# Patient Record
Sex: FEMALE | Race: WHITE | Employment: FULL TIME | URBAN - NONMETROPOLITAN AREA
[De-identification: names, ages, dates, MRNs, and addresses within clinical notes are randomized per-mention and may not be internally consistent; named-entity substitution may affect disease eponyms.]

---

## 2017-02-02 ENCOUNTER — OFFICE VISIT (OUTPATIENT)
Dept: FAMILY MEDICINE CLINIC | Age: 57
End: 2017-02-02

## 2017-02-02 VITALS
SYSTOLIC BLOOD PRESSURE: 134 MMHG | HEART RATE: 66 BPM | WEIGHT: 150 LBS | HEIGHT: 64 IN | DIASTOLIC BLOOD PRESSURE: 72 MMHG | BODY MASS INDEX: 25.61 KG/M2

## 2017-02-02 DIAGNOSIS — E78.00 PURE HYPERCHOLESTEROLEMIA: Primary | ICD-10-CM

## 2017-02-02 DIAGNOSIS — J06.9 VIRAL UPPER RESPIRATORY TRACT INFECTION: ICD-10-CM

## 2017-02-02 DIAGNOSIS — E55.9 VITAMIN D DEFICIENCY: ICD-10-CM

## 2017-02-02 DIAGNOSIS — E78.2 HYPERLIPEMIA, MIXED: ICD-10-CM

## 2017-02-02 DIAGNOSIS — C91.12 CLL (CHRONIC LYMPHOID LEUKEMIA) IN RELAPSE (HCC): ICD-10-CM

## 2017-02-02 DIAGNOSIS — J32.4 CHRONIC PANSINUSITIS: ICD-10-CM

## 2017-02-02 DIAGNOSIS — J30.1 SEASONAL ALLERGIC RHINITIS DUE TO POLLEN: ICD-10-CM

## 2017-02-02 DIAGNOSIS — R53.83 FATIGUE, UNSPECIFIED TYPE: ICD-10-CM

## 2017-02-02 PROCEDURE — 99214 OFFICE O/P EST MOD 30 MIN: CPT | Performed by: PHYSICIAN ASSISTANT

## 2017-02-02 RX ORDER — EZETIMIBE 10 MG/1
10 TABLET ORAL DAILY
Qty: 90 TABLET | Refills: 1 | Status: SHIPPED | OUTPATIENT
Start: 2017-02-02 | End: 2017-08-04 | Stop reason: SDUPTHER

## 2017-02-02 RX ORDER — MOMETASONE FUROATE 50 UG/1
2 SPRAY, METERED NASAL DAILY
Qty: 3 INHALER | Refills: 3 | Status: SHIPPED | OUTPATIENT
Start: 2017-02-02 | End: 2018-10-05 | Stop reason: SDUPTHER

## 2017-02-02 RX ORDER — AZITHROMYCIN 250 MG/1
TABLET, FILM COATED ORAL
Qty: 1 PACKET | Refills: 1 | Status: SHIPPED | OUTPATIENT
Start: 2017-02-02 | End: 2017-02-12

## 2017-02-02 ASSESSMENT — ENCOUNTER SYMPTOMS
NAUSEA: 0
CONSTIPATION: 0
WHEEZING: 0
VOMITING: 0
COUGH: 1
RHINORRHEA: 1
CHEST TIGHTNESS: 0
DIARRHEA: 0
SORE THROAT: 1
SHORTNESS OF BREATH: 0

## 2017-03-06 LAB
ABSOLUTE EOS #: 0 K/UL (ref 0–0.4)
ABSOLUTE LYMPH #: 16.69 K/UL (ref 1–4.8)
ABSOLUTE MONO #: 0.62 K/UL (ref 0.1–1.2)
ATYPICAL LYMPHOCYTE ABSOLUTE COUNT: 1.85 K/UL
ATYPICAL LYMPHOCYTES: 9 % (ref 0–10)
BASOPHILS # BLD: 0 % (ref 0–2)
BASOPHILS ABSOLUTE: 0 K/UL (ref 0–0.2)
DIFFERENTIAL TYPE: ABNORMAL
EOSINOPHILS RELATIVE PERCENT: 0 % (ref 1–4)
HCT VFR BLD CALC: 38.9 % (ref 36–46)
HEMOGLOBIN: 12.4 G/DL (ref 12–16)
LYMPHOCYTES # BLD: 81 % (ref 24–44)
MCH RBC QN AUTO: 29.6 PG (ref 26–34)
MCHC RBC AUTO-ENTMCNC: 32 G/DL (ref 31–37)
MCV RBC AUTO: 92.5 FL (ref 80–100)
MONOCYTES # BLD: 3 % (ref 1–7)
MORPHOLOGY: ABNORMAL
PDW BLD-RTO: 13.2 % (ref 11–14.5)
PLATELET # BLD: 175 K/UL (ref 140–450)
PLATELET ESTIMATE: ABNORMAL
PMV BLD AUTO: 11 FL (ref 6–12)
RBC # BLD: 4.2 M/UL (ref 4–5.2)
RBC # BLD: ABNORMAL 10*6/UL
SEG NEUTROPHILS: 7 % (ref 36–66)
SEGMENTED NEUTROPHILS ABSOLUTE COUNT: 1.44 K/UL (ref 1.8–7.7)
WBC # BLD: 20.6 K/UL (ref 3.5–11)
WBC # BLD: ABNORMAL 10*3/UL

## 2017-04-06 LAB
ABSOLUTE EOS #: 0 K/UL (ref 0–0.4)
ABSOLUTE LYMPH #: 16.5 K/UL (ref 1–4.8)
ABSOLUTE MONO #: 1.1 K/UL (ref 0.1–1.2)
BASOPHILS # BLD: 1 % (ref 0–2)
BASOPHILS ABSOLUTE: 0.22 K/UL (ref 0–0.2)
DIFFERENTIAL TYPE: ABNORMAL
EOSINOPHILS RELATIVE PERCENT: 0 % (ref 1–4)
HCT VFR BLD CALC: 37.5 % (ref 36–46)
HEMOGLOBIN: 12.7 G/DL (ref 12–16)
LYMPHOCYTES # BLD: 75 % (ref 24–44)
MCH RBC QN AUTO: 30.5 PG (ref 26–34)
MCHC RBC AUTO-ENTMCNC: 33.9 G/DL (ref 31–37)
MCV RBC AUTO: 89.7 FL (ref 80–100)
MONOCYTES # BLD: 5 % (ref 1–7)
MORPHOLOGY: ABNORMAL
PDW BLD-RTO: 12.5 % (ref 11–14.5)
PLATELET # BLD: 220 K/UL (ref 140–450)
PLATELET ESTIMATE: ABNORMAL
PMV BLD AUTO: 10.2 FL (ref 6–12)
RBC # BLD: 4.18 M/UL (ref 4–5.2)
RBC # BLD: ABNORMAL 10*6/UL
SEG NEUTROPHILS: 19 % (ref 36–66)
SEGMENTED NEUTROPHILS ABSOLUTE COUNT: 4.18 K/UL (ref 1.8–7.7)
WBC # BLD: 22 K/UL (ref 3.5–11)
WBC # BLD: ABNORMAL 10*3/UL

## 2017-04-10 ENCOUNTER — TELEPHONE (OUTPATIENT)
Dept: FAMILY MEDICINE CLINIC | Age: 57
End: 2017-04-10

## 2017-05-06 DIAGNOSIS — F43.9 STRESS: ICD-10-CM

## 2017-05-06 DIAGNOSIS — F41.9 ANXIETY: ICD-10-CM

## 2017-05-09 RX ORDER — ALPRAZOLAM 0.5 MG/1
TABLET ORAL
Qty: 45 TABLET | Refills: 5 | Status: SHIPPED | OUTPATIENT
Start: 2017-05-09 | End: 2017-12-05 | Stop reason: SDUPTHER

## 2017-05-15 ENCOUNTER — OFFICE VISIT (OUTPATIENT)
Dept: CARDIOLOGY | Age: 57
End: 2017-05-15
Payer: COMMERCIAL

## 2017-05-15 VITALS
HEART RATE: 75 BPM | RESPIRATION RATE: 16 BRPM | SYSTOLIC BLOOD PRESSURE: 122 MMHG | HEIGHT: 64 IN | BODY MASS INDEX: 23.83 KG/M2 | WEIGHT: 139.6 LBS | DIASTOLIC BLOOD PRESSURE: 74 MMHG

## 2017-05-15 DIAGNOSIS — E78.5 HYPERLIPIDEMIA, UNSPECIFIED HYPERLIPIDEMIA TYPE: ICD-10-CM

## 2017-05-15 DIAGNOSIS — I51.89 DIASTOLIC DYSFUNCTION: Primary | ICD-10-CM

## 2017-05-15 PROCEDURE — 99213 OFFICE O/P EST LOW 20 MIN: CPT | Performed by: INTERNAL MEDICINE

## 2017-05-15 PROCEDURE — 93000 ELECTROCARDIOGRAM COMPLETE: CPT | Performed by: INTERNAL MEDICINE

## 2017-05-15 RX ORDER — IBRUTINIB 140 MG/1
1 TABLET, FILM COATED ORAL DAILY
COMMUNITY
Start: 2017-05-08

## 2017-08-04 ENCOUNTER — OFFICE VISIT (OUTPATIENT)
Dept: FAMILY MEDICINE CLINIC | Age: 57
End: 2017-08-04
Payer: COMMERCIAL

## 2017-08-04 VITALS
WEIGHT: 135 LBS | SYSTOLIC BLOOD PRESSURE: 120 MMHG | OXYGEN SATURATION: 98 % | DIASTOLIC BLOOD PRESSURE: 68 MMHG | HEIGHT: 64 IN | BODY MASS INDEX: 23.05 KG/M2 | HEART RATE: 62 BPM

## 2017-08-04 DIAGNOSIS — L98.9 SKIN LESION: Primary | ICD-10-CM

## 2017-08-04 DIAGNOSIS — E78.00 PURE HYPERCHOLESTEROLEMIA: ICD-10-CM

## 2017-08-04 DIAGNOSIS — C91.12 CLL (CHRONIC LYMPHOID LEUKEMIA) IN RELAPSE (HCC): ICD-10-CM

## 2017-08-04 DIAGNOSIS — J30.1 SEASONAL ALLERGIC RHINITIS DUE TO POLLEN: ICD-10-CM

## 2017-08-04 DIAGNOSIS — E78.2 HYPERLIPEMIA, MIXED: ICD-10-CM

## 2017-08-04 DIAGNOSIS — F41.8 DEPRESSION WITH ANXIETY: ICD-10-CM

## 2017-08-04 PROCEDURE — 99214 OFFICE O/P EST MOD 30 MIN: CPT | Performed by: PHYSICIAN ASSISTANT

## 2017-08-04 RX ORDER — EZETIMIBE 10 MG/1
10 TABLET ORAL DAILY
Qty: 90 TABLET | Refills: 1 | Status: SHIPPED | OUTPATIENT
Start: 2017-08-04 | End: 2018-05-21 | Stop reason: ALTCHOICE

## 2017-08-04 ASSESSMENT — ENCOUNTER SYMPTOMS
NAUSEA: 1
DIARRHEA: 0
CHEST TIGHTNESS: 0
COUGH: 0
SHORTNESS OF BREATH: 0

## 2017-08-06 ASSESSMENT — ENCOUNTER SYMPTOMS
VOMITING: 0
RHINORRHEA: 0
SORE THROAT: 0
TROUBLE SWALLOWING: 0
ABDOMINAL PAIN: 0
CONSTIPATION: 0
BLOOD IN STOOL: 0
WHEEZING: 0
SINUS PRESSURE: 0

## 2017-09-11 ENCOUNTER — HOSPITAL ENCOUNTER (OUTPATIENT)
Dept: LAB | Age: 57
Discharge: HOME | End: 2017-09-11
Payer: COMMERCIAL

## 2017-09-11 DIAGNOSIS — E78.00 PURE HYPERCHOLESTEROLEMIA: ICD-10-CM

## 2017-09-11 DIAGNOSIS — E78.2 HYPERLIPEMIA, MIXED: ICD-10-CM

## 2017-09-11 LAB
CHOLESTEROL/HDL RATIO: 2.9
CHOLESTEROL: 197 MG/DL
HDLC SERPL-MCNC: 68 MG/DL
LDL CHOLESTEROL: 114 MG/DL (ref 0–130)
TRIGL SERPL-MCNC: 74 MG/DL
VLDLC SERPL CALC-MCNC: NORMAL MG/DL (ref 1–30)

## 2017-09-11 PROCEDURE — 80061 LIPID PANEL: CPT

## 2017-09-11 PROCEDURE — 36415 COLL VENOUS BLD VENIPUNCTURE: CPT

## 2017-12-05 DIAGNOSIS — F43.9 STRESS: ICD-10-CM

## 2017-12-05 DIAGNOSIS — F41.9 ANXIETY: ICD-10-CM

## 2017-12-05 RX ORDER — ALPRAZOLAM 0.5 MG/1
TABLET ORAL
Qty: 45 TABLET | Refills: 5 | Status: SHIPPED | OUTPATIENT
Start: 2017-12-05 | End: 2018-07-01 | Stop reason: SDUPTHER

## 2018-02-15 ENCOUNTER — OFFICE VISIT (OUTPATIENT)
Dept: FAMILY MEDICINE CLINIC | Age: 58
End: 2018-02-15
Payer: COMMERCIAL

## 2018-02-15 ENCOUNTER — NURSE ONLY (OUTPATIENT)
Dept: LAB | Age: 58
End: 2018-02-15
Payer: COMMERCIAL

## 2018-02-15 VITALS
DIASTOLIC BLOOD PRESSURE: 70 MMHG | HEART RATE: 70 BPM | OXYGEN SATURATION: 97 % | WEIGHT: 135 LBS | HEIGHT: 64 IN | SYSTOLIC BLOOD PRESSURE: 132 MMHG | BODY MASS INDEX: 23.05 KG/M2

## 2018-02-15 DIAGNOSIS — F41.9 ANXIETY: ICD-10-CM

## 2018-02-15 DIAGNOSIS — E78.00 PURE HYPERCHOLESTEROLEMIA: ICD-10-CM

## 2018-02-15 DIAGNOSIS — R53.82 CHRONIC FATIGUE: ICD-10-CM

## 2018-02-15 DIAGNOSIS — Z23 NEED FOR TETANUS BOOSTER: ICD-10-CM

## 2018-02-15 DIAGNOSIS — M85.88 OSTEOPENIA OF SPINE: Primary | ICD-10-CM

## 2018-02-15 DIAGNOSIS — C91.10 CLL (CHRONIC LYMPHOCYTIC LEUKEMIA) (HCC): ICD-10-CM

## 2018-02-15 DIAGNOSIS — J32.4 CHRONIC PANSINUSITIS: ICD-10-CM

## 2018-02-15 DIAGNOSIS — Z85.72 HISTORY OF NON-HODGKIN'S LYMPHOMA: ICD-10-CM

## 2018-02-15 DIAGNOSIS — Z23 NEED FOR VACCINATION: Primary | ICD-10-CM

## 2018-02-15 PROCEDURE — 90471 IMMUNIZATION ADMIN: CPT | Performed by: PHYSICIAN ASSISTANT

## 2018-02-15 PROCEDURE — 90715 TDAP VACCINE 7 YRS/> IM: CPT | Performed by: PHYSICIAN ASSISTANT

## 2018-02-15 PROCEDURE — 99214 OFFICE O/P EST MOD 30 MIN: CPT | Performed by: PHYSICIAN ASSISTANT

## 2018-02-15 ASSESSMENT — ENCOUNTER SYMPTOMS
RESPIRATORY NEGATIVE: 1
NAUSEA: 1

## 2018-02-15 NOTE — PROGRESS NOTES
present. Thyroid nontender no palpable. No carotid bruits. Lymphadenopathy has improved dramatically in her neck. Markedly reduced and almost nonpalpable. Cardiovascular: Normal rate, regular rhythm and normal heart sounds. Exam reveals no gallop and no friction rub. No murmur heard. Pulmonary/Chest: Effort normal and breath sounds normal. She has no wheezes. She has no rales. Abdominal: Soft. Bowel sounds are normal. She exhibits no distension and no mass. There is no tenderness. There is no rebound and no guarding. Musculoskeletal: She exhibits no edema. Lymphadenopathy:     She has no cervical adenopathy. Neurological: She is alert and oriented to person, place, and time. Skin: Skin is warm and dry. No rash noted. Psychiatric: She has a normal mood and affect. Her behavior is normal. Judgment and thought content normal.   Nursing note and vitals reviewed. Assessment:      1. Osteopenia of spine  DEXA BONE DENSITY 2 SITES   2. Need for tetanus booster  Tdap (age 6y and older) IM ([address])   3. Pure hypercholesterolemia  Lipid Panel    Lipid, Fasting   4. Chronic fatigue  Vitamin D 25 Hydroxy    Vitamin B12 & Folate    TSH without Reflex   5. CLL (chronic lymphocytic leukemia) ([facility] [address]     6. Anxiety     7. History of non-Hodgkin's lymphoma     8. Chronic pansinusitis             Plan:       Will stop zetia for now stay on lovaza  Will repeat lipids in 3 months  She will be notified of all results  Further treatment based on results  Diet as tolerated  Follow-up with [address] [place] [address],[address]  Follow-up with me 6 months sooner if problems  I will contact her with lab results and we'll discuss treatment options at that time

## 2018-02-17 ASSESSMENT — ENCOUNTER SYMPTOMS
COUGH: 0
CHEST TIGHTNESS: 0
SHORTNESS OF BREATH: 0
VOMITING: 0
DIARRHEA: 0
CONSTIPATION: 0
WHEEZING: 0

## 2018-02-20 ENCOUNTER — HOSPITAL ENCOUNTER (OUTPATIENT)
Dept: LAB | Age: 58
Setting detail: SPECIMEN
Discharge: HOME | End: 2018-02-20
Payer: COMMERCIAL

## 2018-02-20 ENCOUNTER — HOSPITAL ENCOUNTER (OUTPATIENT)
Dept: BONE DENSITY | Age: 58
Discharge: HOME | End: 2018-02-22
Payer: COMMERCIAL

## 2018-02-20 DIAGNOSIS — R53.82 CHRONIC FATIGUE: ICD-10-CM

## 2018-02-20 DIAGNOSIS — M85.88 OSTEOPENIA OF SPINE: ICD-10-CM

## 2018-02-20 LAB — TSH SERPL DL<=0.05 MIU/L-ACNC: 2.56 MIU/L (ref 0.3–5)

## 2018-02-20 PROCEDURE — 84443 ASSAY THYROID STIM HORMONE: CPT

## 2018-02-20 PROCEDURE — 77080 DXA BONE DENSITY AXIAL: CPT

## 2018-02-20 PROCEDURE — 36415 COLL VENOUS BLD VENIPUNCTURE: CPT

## 2018-05-21 ENCOUNTER — OFFICE VISIT (OUTPATIENT)
Dept: CARDIOLOGY | Age: 58
End: 2018-05-21
Payer: COMMERCIAL

## 2018-05-21 ENCOUNTER — HOSPITAL ENCOUNTER (OUTPATIENT)
Dept: LAB | Age: 58
Setting detail: SPECIMEN
Discharge: HOME | End: 2018-05-21
Payer: COMMERCIAL

## 2018-05-21 VITALS
BODY MASS INDEX: 22.33 KG/M2 | WEIGHT: 130.8 LBS | HEART RATE: 60 BPM | HEIGHT: 64 IN | SYSTOLIC BLOOD PRESSURE: 122 MMHG | DIASTOLIC BLOOD PRESSURE: 72 MMHG

## 2018-05-21 DIAGNOSIS — E78.00 PURE HYPERCHOLESTEROLEMIA: ICD-10-CM

## 2018-05-21 DIAGNOSIS — I51.89 DIASTOLIC DYSFUNCTION: ICD-10-CM

## 2018-05-21 DIAGNOSIS — E78.5 HYPERLIPIDEMIA, UNSPECIFIED HYPERLIPIDEMIA TYPE: Primary | ICD-10-CM

## 2018-05-21 PROBLEM — E78.49 OTHER HYPERLIPIDEMIA: Status: ACTIVE | Noted: 2018-05-21

## 2018-05-21 LAB
CHOLESTEROL, FASTING: 206 MG/DL
CHOLESTEROL/HDL RATIO: 3.2
HDLC SERPL-MCNC: 64 MG/DL
LDL CHOLESTEROL: 130 MG/DL (ref 0–130)
TRIGLYCERIDE, FASTING: 60 MG/DL
VLDLC SERPL CALC-MCNC: ABNORMAL MG/DL (ref 1–30)

## 2018-05-21 PROCEDURE — 36415 COLL VENOUS BLD VENIPUNCTURE: CPT

## 2018-05-21 PROCEDURE — 99213 OFFICE O/P EST LOW 20 MIN: CPT | Performed by: INTERNAL MEDICINE

## 2018-05-21 PROCEDURE — 93000 ELECTROCARDIOGRAM COMPLETE: CPT | Performed by: INTERNAL MEDICINE

## 2018-05-21 PROCEDURE — 80061 LIPID PANEL: CPT

## 2018-05-21 RX ORDER — EZETIMIBE 10 MG/1
10 TABLET ORAL DAILY
Qty: 90 TABLET | Refills: 3 | Status: SHIPPED | OUTPATIENT
Start: 2018-05-21 | End: 2018-06-11 | Stop reason: SDUPTHER

## 2018-05-24 ENCOUNTER — TELEPHONE (OUTPATIENT)
Dept: CARDIOLOGY | Age: 58
End: 2018-05-24

## 2018-05-31 ENCOUNTER — HOSPITAL ENCOUNTER (OUTPATIENT)
Dept: INTERVENTIONAL RADIOLOGY/VASCULAR | Age: 58
Discharge: HOME | End: 2018-06-02
Payer: COMMERCIAL

## 2018-05-31 ENCOUNTER — OFFICE VISIT (OUTPATIENT)
Dept: PRIMARY CARE CLINIC | Age: 58
End: 2018-05-31
Payer: COMMERCIAL

## 2018-05-31 VITALS
SYSTOLIC BLOOD PRESSURE: 124 MMHG | WEIGHT: 130 LBS | BODY MASS INDEX: 22.3 KG/M2 | HEART RATE: 88 BPM | TEMPERATURE: 98.6 F | OXYGEN SATURATION: 97 % | DIASTOLIC BLOOD PRESSURE: 80 MMHG

## 2018-05-31 DIAGNOSIS — M79.604 PAIN IN RIGHT LEG: ICD-10-CM

## 2018-05-31 DIAGNOSIS — M71.21 SYNOVIAL CYST OF RIGHT POPLITEAL SPACE: Primary | ICD-10-CM

## 2018-05-31 DIAGNOSIS — M79.89 RIGHT LEG SWELLING: ICD-10-CM

## 2018-05-31 PROCEDURE — 93971 EXTREMITY STUDY: CPT

## 2018-05-31 PROCEDURE — 99214 OFFICE O/P EST MOD 30 MIN: CPT | Performed by: FAMILY MEDICINE

## 2018-05-31 ASSESSMENT — PATIENT HEALTH QUESTIONNAIRE - PHQ9
2. FEELING DOWN, DEPRESSED OR HOPELESS: 0
SUM OF ALL RESPONSES TO PHQ QUESTIONS 1-9: 0
1. LITTLE INTEREST OR PLEASURE IN DOING THINGS: 0
SUM OF ALL RESPONSES TO PHQ9 QUESTIONS 1 & 2: 0

## 2018-05-31 ASSESSMENT — ENCOUNTER SYMPTOMS: COLOR CHANGE: 0

## 2018-06-11 DIAGNOSIS — E78.5 HYPERLIPIDEMIA, UNSPECIFIED HYPERLIPIDEMIA TYPE: ICD-10-CM

## 2018-06-11 RX ORDER — EZETIMIBE 10 MG/1
10 TABLET ORAL DAILY
Qty: 90 TABLET | Refills: 3 | Status: SHIPPED | OUTPATIENT
Start: 2018-06-11 | End: 2019-12-20 | Stop reason: SDUPTHER

## 2018-07-01 DIAGNOSIS — F43.9 STRESS: ICD-10-CM

## 2018-07-01 DIAGNOSIS — F41.9 ANXIETY: ICD-10-CM

## 2018-07-02 RX ORDER — ALPRAZOLAM 0.5 MG/1
TABLET ORAL
Qty: 45 TABLET | Refills: 5 | Status: SHIPPED | OUTPATIENT
Start: 2018-07-02 | End: 2019-01-03 | Stop reason: SDUPTHER

## 2018-07-09 ENCOUNTER — TELEPHONE (OUTPATIENT)
Dept: FAMILY MEDICINE CLINIC | Age: 58
End: 2018-07-09

## 2018-07-10 ENCOUNTER — HOSPITAL ENCOUNTER (OUTPATIENT)
Dept: GENERAL RADIOLOGY | Age: 58
Discharge: HOME | End: 2018-07-12
Payer: COMMERCIAL

## 2018-07-10 ENCOUNTER — OFFICE VISIT (OUTPATIENT)
Dept: FAMILY MEDICINE CLINIC | Age: 58
End: 2018-07-10
Payer: COMMERCIAL

## 2018-07-10 ENCOUNTER — OFFICE VISIT (OUTPATIENT)
Dept: ORTHOPEDIC SURGERY | Age: 58
End: 2018-07-10
Payer: COMMERCIAL

## 2018-07-10 VITALS
HEART RATE: 75 BPM | WEIGHT: 132 LBS | DIASTOLIC BLOOD PRESSURE: 62 MMHG | SYSTOLIC BLOOD PRESSURE: 110 MMHG | BODY MASS INDEX: 22.53 KG/M2 | OXYGEN SATURATION: 98 % | HEIGHT: 64 IN

## 2018-07-10 VITALS
SYSTOLIC BLOOD PRESSURE: 110 MMHG | WEIGHT: 132 LBS | HEART RATE: 75 BPM | BODY MASS INDEX: 22.53 KG/M2 | HEIGHT: 64 IN | DIASTOLIC BLOOD PRESSURE: 62 MMHG

## 2018-07-10 DIAGNOSIS — M25.561 ACUTE PAIN OF RIGHT KNEE: ICD-10-CM

## 2018-07-10 DIAGNOSIS — R22.41 MASS OF RIGHT KNEE: ICD-10-CM

## 2018-07-10 DIAGNOSIS — M25.861 CYST OF RIGHT KNEE JOINT: ICD-10-CM

## 2018-07-10 DIAGNOSIS — C91.10 CHRONIC LYMPHOCYTIC LEUKEMIA (HCC): Primary | ICD-10-CM

## 2018-07-10 DIAGNOSIS — M25.861 CYST OF RIGHT KNEE JOINT: Primary | ICD-10-CM

## 2018-07-10 PROCEDURE — 99202 OFFICE O/P NEW SF 15 MIN: CPT | Performed by: PHYSICIAN ASSISTANT

## 2018-07-10 PROCEDURE — 99213 OFFICE O/P EST LOW 20 MIN: CPT | Performed by: PHYSICIAN ASSISTANT

## 2018-07-10 PROCEDURE — 73562 X-RAY EXAM OF KNEE 3: CPT

## 2018-07-10 ASSESSMENT — ENCOUNTER SYMPTOMS
COUGH: 0
SHORTNESS OF BREATH: 0
NAUSEA: 0
COLOR CHANGE: 0

## 2018-07-10 NOTE — PROGRESS NOTES
lesion noted in the medial right popliteal area it is mildly tender to palpation. Cyst is palpated with leg extended. Neurological: She is oriented to person, place, and time. Nl strength and sensation lower extremities bilaterally. Has nl gait. Skin: Skin is dry. No rash noted. No erythema. Psychiatric: She has a normal mood and affect. Nursing note and vitals reviewed. Xr Knee Right (3 Views)    Result Date: 7/10/2018  EXAMINATION: 3 XRAY VIEWS OF THE RIGHT KNEE 7/10/2018 12:03 pm COMPARISON: None. HISTORY: ORDERING SYSTEM PROVIDED HISTORY: Cyst of right knee joint TECHNOLOGIST PROVIDED HISTORY: Reason for exam:->right knee pain ?bakers cyst Ordering Physician Provided Reason for Exam: No injury; pt states posterior rt knee pain; ?bakers cyst Acuity: Chronic Type of Exam: Unknown FINDINGS: No acute fracture, dislocation or suspicious osseous lesions are identified. No joint effusion identified. Joint spaces appear maintained. Visualized osseous structures appear mildly demineralized. Soft tissues have a normal radiographic appearance. Suspected osteoporotic changes, but no radiographic evidence for acute osseous abnormality. Assessment:       Diagnosis Orders   1. Cyst of right knee joint  Ambulatory referral to Orthopedic Surgery    XR KNEE RIGHT (3 VIEWS)   2. Acute pain of right knee  Ambulatory referral to Orthopedic Surgery    XR KNEE RIGHT (3 VIEWS)           Plan:       Will have orthopedics see patient asap  Will let ortho decide what testing is best for patient  Will get xray before the see patient  Reviewed recent ultrasound  nsaids heat   Recommended a neoprene brace if it helps  Answered all patients questions

## 2018-07-16 ENCOUNTER — HOSPITAL ENCOUNTER (OUTPATIENT)
Dept: MRI IMAGING | Age: 58
Discharge: HOME | End: 2018-07-18
Payer: COMMERCIAL

## 2018-07-16 DIAGNOSIS — C91.10 CHRONIC LYMPHOCYTIC LEUKEMIA (HCC): ICD-10-CM

## 2018-07-16 DIAGNOSIS — M25.561 ACUTE PAIN OF RIGHT KNEE: ICD-10-CM

## 2018-07-16 DIAGNOSIS — R22.41 MASS OF RIGHT KNEE: ICD-10-CM

## 2018-07-16 PROCEDURE — 73721 MRI JNT OF LWR EXTRE W/O DYE: CPT

## 2018-07-24 ENCOUNTER — TELEPHONE (OUTPATIENT)
Dept: FAMILY MEDICINE CLINIC | Age: 58
End: 2018-07-24

## 2018-07-25 ENCOUNTER — TELEPHONE (OUTPATIENT)
Dept: ORTHOPEDIC SURGERY | Age: 58
End: 2018-07-25

## 2018-08-13 DIAGNOSIS — M25.361 INSTABILITY OF RIGHT KNEE JOINT: Primary | ICD-10-CM

## 2018-08-16 ENCOUNTER — HOSPITAL ENCOUNTER (OUTPATIENT)
Dept: LAB | Age: 58
Setting detail: SPECIMEN
Discharge: HOME | End: 2018-08-16
Payer: COMMERCIAL

## 2018-08-16 ENCOUNTER — HOSPITAL ENCOUNTER (OUTPATIENT)
Dept: GENERAL RADIOLOGY | Age: 58
Discharge: HOME | End: 2018-08-18
Payer: COMMERCIAL

## 2018-08-16 ENCOUNTER — OFFICE VISIT (OUTPATIENT)
Dept: FAMILY MEDICINE CLINIC | Age: 58
End: 2018-08-16
Payer: COMMERCIAL

## 2018-08-16 VITALS
HEART RATE: 62 BPM | OXYGEN SATURATION: 98 % | BODY MASS INDEX: 22.83 KG/M2 | DIASTOLIC BLOOD PRESSURE: 60 MMHG | WEIGHT: 133 LBS | SYSTOLIC BLOOD PRESSURE: 112 MMHG

## 2018-08-16 DIAGNOSIS — C85.90 NON-HODGKIN'S LYMPHOMA, UNSPECIFIED BODY REGION, UNSPECIFIED NON-HODGKIN LYMPHOMA TYPE (HCC): ICD-10-CM

## 2018-08-16 DIAGNOSIS — Z23 NEED FOR PNEUMOCOCCAL VACCINATION: ICD-10-CM

## 2018-08-16 DIAGNOSIS — M25.561 ACUTE PAIN OF RIGHT KNEE: ICD-10-CM

## 2018-08-16 DIAGNOSIS — Z01.818 PREOP TESTING: ICD-10-CM

## 2018-08-16 DIAGNOSIS — C91.10 CLL (CHRONIC LYMPHOCYTIC LEUKEMIA) (HCC): ICD-10-CM

## 2018-08-16 DIAGNOSIS — E78.00 PURE HYPERCHOLESTEROLEMIA: ICD-10-CM

## 2018-08-16 DIAGNOSIS — Z23 NEED FOR SHINGLES VACCINE: Primary | ICD-10-CM

## 2018-08-16 DIAGNOSIS — J32.4 CHRONIC PANSINUSITIS: ICD-10-CM

## 2018-08-16 DIAGNOSIS — R53.82 CHRONIC FATIGUE: ICD-10-CM

## 2018-08-16 DIAGNOSIS — F41.8 ANXIETY WITH DEPRESSION: ICD-10-CM

## 2018-08-16 LAB
CHOLESTEROL/HDL RATIO: 2.9
CHOLESTEROL: 194 MG/DL
FOLATE: >20 NG/ML
HDLC SERPL-MCNC: 68 MG/DL
LDL CHOLESTEROL: 112 MG/DL (ref 0–130)
TRIGL SERPL-MCNC: 72 MG/DL
VITAMIN B-12: 748 PG/ML (ref 232–1245)
VITAMIN D 25-HYDROXY: 94.8 NG/ML (ref 30–100)
VLDLC SERPL CALC-MCNC: NORMAL MG/DL (ref 1–30)

## 2018-08-16 PROCEDURE — 82746 ASSAY OF FOLIC ACID SERUM: CPT

## 2018-08-16 PROCEDURE — 82607 VITAMIN B-12: CPT

## 2018-08-16 PROCEDURE — 71046 X-RAY EXAM CHEST 2 VIEWS: CPT

## 2018-08-16 PROCEDURE — 99213 OFFICE O/P EST LOW 20 MIN: CPT | Performed by: PHYSICIAN ASSISTANT

## 2018-08-16 PROCEDURE — 82306 VITAMIN D 25 HYDROXY: CPT

## 2018-08-16 PROCEDURE — 80061 LIPID PANEL: CPT

## 2018-08-16 PROCEDURE — 36415 COLL VENOUS BLD VENIPUNCTURE: CPT

## 2018-08-16 ASSESSMENT — ENCOUNTER SYMPTOMS
GASTROINTESTINAL NEGATIVE: 1
RESPIRATORY NEGATIVE: 1

## 2018-08-16 NOTE — PROGRESS NOTES
8. Pure hypercholesterolemia     9. Acute pain of right knee             Plan:      Follow-up with me 6 months sooner if problems  Good nutrition hydration  We'll go ahead and get a chest x-ray prior to her surgery next week  Answered all of her questions  Follow-up with oncology as scheduled at [address] [place] [address] working on lifestyle changes      Maria M Corey received counseling on the following healthy behaviors: nutrition, exercise and medication adherence    Patient given educational materials on Hyperlipidemia, Nutrition, Exercise and mood    I have instructed Maria M Leora to complete a self tracking handout on Weights and mood and instructed them to bring it with them to her next appointment. Discussed use, benefit, and side effects of prescribed medications. Barriers to medication compliance addressed. All patient questions answered. Pt voiced understanding.            TRAVIS Johnson

## 2018-08-20 ASSESSMENT — ENCOUNTER SYMPTOMS
SINUS PRESSURE: 0
SORE THROAT: 0
SINUS PAIN: 0
VOMITING: 0
DIARRHEA: 0
SHORTNESS OF BREATH: 0
TROUBLE SWALLOWING: 0
BLOOD IN STOOL: 0
COUGH: 0
WHEEZING: 0
ABDOMINAL PAIN: 0
RHINORRHEA: 0
NAUSEA: 0

## 2018-09-04 ENCOUNTER — OFFICE VISIT (OUTPATIENT)
Dept: ORTHOPEDIC SURGERY | Age: 58
End: 2018-09-04

## 2018-09-04 VITALS
HEART RATE: 63 BPM | HEIGHT: 64 IN | SYSTOLIC BLOOD PRESSURE: 124 MMHG | BODY MASS INDEX: 22.2 KG/M2 | WEIGHT: 130 LBS | DIASTOLIC BLOOD PRESSURE: 68 MMHG

## 2018-09-04 DIAGNOSIS — S83.241S TEAR OF MEDIAL MENISCUS OF RIGHT KNEE, UNSPECIFIED TEAR TYPE, UNSPECIFIED WHETHER OLD OR CURRENT TEAR, SEQUELA: Primary | ICD-10-CM

## 2018-09-04 PROCEDURE — 99024 POSTOP FOLLOW-UP VISIT: CPT | Performed by: NURSE PRACTITIONER

## 2018-09-21 ENCOUNTER — TELEPHONE (OUTPATIENT)
Dept: ORTHOPEDIC SURGERY | Age: 58
End: 2018-09-21

## 2018-09-21 NOTE — TELEPHONE ENCOUNTER
Patient called she had knee scope beginning of September. Knee was feeling pretty well and patient went for a walk. Got home was still feeling good decided to use her treadmill. Next day her knee is swollen and sore. Told patient to elevate and ice knee. Make sure not warm to touch or looks red and if it does to come into  Urgent Care or go to the ER. Told patient we could add her to the schedule if still having problems, stated wanted to wait and see how it feels after the weekend. If still having problems will call in Monday to see about coming in Tuesday.

## 2018-10-05 ENCOUNTER — OFFICE VISIT (OUTPATIENT)
Dept: FAMILY MEDICINE CLINIC | Age: 58
End: 2018-10-05
Payer: COMMERCIAL

## 2018-10-05 VITALS
TEMPERATURE: 98.3 F | HEART RATE: 88 BPM | DIASTOLIC BLOOD PRESSURE: 70 MMHG | BODY MASS INDEX: 22.53 KG/M2 | OXYGEN SATURATION: 98 % | HEIGHT: 64 IN | SYSTOLIC BLOOD PRESSURE: 128 MMHG | WEIGHT: 132 LBS

## 2018-10-05 DIAGNOSIS — J30.1 SEASONAL ALLERGIC RHINITIS DUE TO POLLEN: ICD-10-CM

## 2018-10-05 DIAGNOSIS — J06.9 VIRAL URI: Primary | ICD-10-CM

## 2018-10-05 PROCEDURE — 99213 OFFICE O/P EST LOW 20 MIN: CPT | Performed by: NURSE PRACTITIONER

## 2018-10-05 RX ORDER — ALPRAZOLAM 0.5 MG/1
1 TABLET ORAL DAILY
Refills: 0 | COMMUNITY
Start: 2018-10-04 | End: 2019-02-04 | Stop reason: SDUPTHER

## 2018-10-05 RX ORDER — MOMETASONE FUROATE 50 UG/1
2 SPRAY, METERED NASAL DAILY
Qty: 3 INHALER | Refills: 3 | Status: SHIPPED | OUTPATIENT
Start: 2018-10-05 | End: 2020-01-08

## 2018-10-05 ASSESSMENT — ENCOUNTER SYMPTOMS
SINUS PRESSURE: 1
SORE THROAT: 1
SHORTNESS OF BREATH: 0
COUGH: 1
WHEEZING: 0
RHINORRHEA: 1

## 2018-10-05 NOTE — PROGRESS NOTES
sinus tenderness. Right sinus exhibits no frontal sinus tenderness. Left sinus exhibits maxillary sinus tenderness. Left sinus exhibits no frontal sinus tenderness. Mouth/Throat: Uvula is midline and mucous membranes are normal. Posterior oropharyngeal erythema (mild) present. Eyes: Pupils are equal, round, and reactive to light. Neck: Neck supple. Cardiovascular: Normal rate, regular rhythm and normal heart sounds. Pulmonary/Chest: Effort normal and breath sounds normal. No respiratory distress. She has no wheezes. She has no rales. Neurological: She is alert and oriented to person, place, and time. Nursing note and vitals reviewed. Assessment:       Diagnosis Orders   1. Viral URI     2.  Seasonal allergic rhinitis due to pollen  mometasone (NASONEX) 50 MCG/ACT nasal spray           Plan:      Supportive care  She is to call if she is no improvement will send in antibiotic   Push fluids  Pt to return if symptoms do not improve or worsen   Return PRN         SILVANA Saldivar - CNP

## 2018-10-30 ENCOUNTER — HOSPITAL ENCOUNTER (OUTPATIENT)
Dept: LAB | Age: 58
Discharge: HOME | End: 2018-10-30
Payer: COMMERCIAL

## 2018-10-30 LAB
ABSOLUTE EOS #: 0 K/UL (ref 0–0.4)
ABSOLUTE IMMATURE GRANULOCYTE: ABNORMAL K/UL (ref 0–0.3)
ABSOLUTE LYMPH #: 2.5 K/UL (ref 1–4.8)
ABSOLUTE MONO #: 0.7 K/UL (ref 0.1–1.2)
BASOPHILS # BLD: 0 % (ref 0–1)
BASOPHILS ABSOLUTE: 0 K/UL (ref 0–0.2)
DIFFERENTIAL TYPE: ABNORMAL
EOSINOPHILS RELATIVE PERCENT: 0 % (ref 1–7)
HCT VFR BLD CALC: 40.5 % (ref 36–46)
HEMOGLOBIN: 13.1 G/DL (ref 12–16)
IMMATURE GRANULOCYTES: ABNORMAL %
LYMPHOCYTES # BLD: 37 % (ref 16–46)
MCH RBC QN AUTO: 29.4 PG (ref 26–34)
MCHC RBC AUTO-ENTMCNC: 32.5 G/DL (ref 31–37)
MCV RBC AUTO: 90.6 FL (ref 80–100)
MONOCYTES # BLD: 11 % (ref 4–11)
NRBC AUTOMATED: ABNORMAL PER 100 WBC
PDW BLD-RTO: 13.3 % (ref 11–14.5)
PLATELET # BLD: 194 K/UL (ref 140–450)
PLATELET ESTIMATE: ABNORMAL
PMV BLD AUTO: 10.7 FL (ref 6–12)
RBC # BLD: 4.47 M/UL (ref 4–5.2)
RBC # BLD: ABNORMAL 10*6/UL
SEG NEUTROPHILS: 52 % (ref 43–77)
SEGMENTED NEUTROPHILS ABSOLUTE COUNT: 3.6 K/UL (ref 1.8–7.7)
WBC # BLD: 6.9 K/UL (ref 3.5–11)
WBC # BLD: ABNORMAL 10*3/UL

## 2018-10-30 PROCEDURE — 36415 COLL VENOUS BLD VENIPUNCTURE: CPT

## 2018-10-30 PROCEDURE — 85025 COMPLETE CBC W/AUTO DIFF WBC: CPT

## 2018-11-19 ENCOUNTER — NURSE ONLY (OUTPATIENT)
Dept: LAB | Age: 58
End: 2018-11-19
Payer: COMMERCIAL

## 2018-11-19 DIAGNOSIS — Z23 NEED FOR VACCINATION: Primary | ICD-10-CM

## 2018-11-19 PROCEDURE — 90686 IIV4 VACC NO PRSV 0.5 ML IM: CPT | Performed by: PHYSICIAN ASSISTANT

## 2018-11-19 PROCEDURE — 90471 IMMUNIZATION ADMIN: CPT | Performed by: PHYSICIAN ASSISTANT

## 2018-12-26 ENCOUNTER — OFFICE VISIT (OUTPATIENT)
Dept: PRIMARY CARE CLINIC | Age: 58
End: 2018-12-26
Payer: COMMERCIAL

## 2018-12-26 VITALS
HEIGHT: 64 IN | TEMPERATURE: 98.7 F | BODY MASS INDEX: 23.39 KG/M2 | OXYGEN SATURATION: 99 % | SYSTOLIC BLOOD PRESSURE: 138 MMHG | DIASTOLIC BLOOD PRESSURE: 72 MMHG | WEIGHT: 137 LBS | HEART RATE: 78 BPM

## 2018-12-26 DIAGNOSIS — J01.00 ACUTE NON-RECURRENT MAXILLARY SINUSITIS: Primary | ICD-10-CM

## 2018-12-26 PROCEDURE — 99213 OFFICE O/P EST LOW 20 MIN: CPT | Performed by: NURSE PRACTITIONER

## 2018-12-26 RX ORDER — AMOXICILLIN 875 MG/1
875 TABLET, COATED ORAL 2 TIMES DAILY
Qty: 20 TABLET | Refills: 0 | Status: SHIPPED | OUTPATIENT
Start: 2018-12-26 | End: 2019-01-05

## 2018-12-26 ASSESSMENT — ENCOUNTER SYMPTOMS
RHINORRHEA: 0
SORE THROAT: 1
SINUS COMPLAINT: 1
GASTROINTESTINAL NEGATIVE: 1
SINUS PRESSURE: 1
COUGH: 1

## 2018-12-26 NOTE — PATIENT INSTRUCTIONS
washes. Where can you learn more? Go to [email or web address] and sign in to your [employer] account. Enter [id number] [id number] [phone] in the [facility] box to learn more about \"Saline Nasal Washes: Care Instructions. \"     If you do not have an account, please click on the \"Sign Up Now\" link. Current as of: March 28, 2018  Content Version: 11.8  © 8350-2180 [employer]. Care instructions adapted under license by [facility] ([facility]). If you have questions about a medical condition or this instruction, always ask your healthcare professional. [address] any warranty or liability for your use of this information. Patient Education        Sinusitis: Care Instructions  Your Care Instructions    Sinusitis is an infection of the lining of the sinus cavities in your head. Sinusitis often follows a cold. It causes pain and pressure in your head and face. In most cases, sinusitis gets better on its own in 1 to 2 weeks. But some mild symptoms may last for several weeks. Sometimes antibiotics are needed. Follow-up care is a key part of your treatment and safety. Be sure to make and go to all appointments, and call your doctor if you are having problems. It's also a good idea to know your test results and keep a list of the medicines you take. How can you care for yourself at home? · Take an over-the-counter pain medicine, such as acetaminophen (Tylenol), ibuprofen (Advil, Motrin), or naproxen (Aleve). Read and follow all instructions on the label. · If the doctor prescribed antibiotics, take them as directed. Do not stop taking them just because you feel better. You need to take the full course of antibiotics. · Be careful when taking over-the-counter cold or flu medicines and Tylenol at the same time. Many of these medicines have acetaminophen, which is Tylenol. Read the labels to make sure that you are not taking more than the recommended dose.  Too much acetaminophen (Tylenol) can be harmful. · Breathe warm, moist air from a steamy shower, a hot bath, or a sink filled with hot water. Avoid cold, dry air. Using a humidifier in your home may help. Follow the directions for cleaning the machine. · Use saline (saltwater) nasal washes to help keep your nasal passages open and wash out mucus and bacteria. You can buy saline nose drops at a grocery store or drugstore. Or you can make your own at home by adding 1 teaspoon of salt and 1 teaspoon of baking soda to 2 cups of distilled water. If you make your own, fill a bulb syringe with the solution, insert the tip into your nostril, and squeeze gently. Denis Jennifer your nose. · Put a hot, wet towel or a warm gel pack on your face 3 or 4 times a day for 5 to 10 minutes each time. · Try a decongestant nasal spray like oxymetazoline (Afrin). Do not use it for more than 3 days in a row. Using it for more than 3 days can make your congestion worse. When should you call for help? Call your doctor now or seek immediate medical care if:    · You have new or worse swelling or redness in your face or around your eyes.     · You have a new or higher fever.    Watch closely for changes in your health, and be sure to contact your doctor if:    · You have new or worse facial pain.     · The mucus from your nose becomes thicker (like pus) or has new blood in it.     · You are not getting better as expected. Where can you learn more? Go to https://[email or web address].[email or web address] org and sign in to your [email or web address] account. Enter L325 in the [facility] box to learn more about \"Sinusitis: Care Instructions. \"     If you do not have an account, please click on the \"Sign Up Now\" link. Current as of: March 28, 2018  Content Version: 11.8  © 8420-2972 Healthwise, Incorporated. Care instructions adapted under license by [facility] ([facility]).  If you have questions about a medical condition or this instruction, always ask your healthcare professional.

## 2019-01-03 DIAGNOSIS — F43.9 STRESS: ICD-10-CM

## 2019-01-03 DIAGNOSIS — F41.9 ANXIETY: ICD-10-CM

## 2019-01-03 DIAGNOSIS — C91.10 CLL (CHRONIC LYMPHOCYTIC LEUKEMIA) (HCC): Primary | ICD-10-CM

## 2019-01-03 RX ORDER — ALPRAZOLAM 0.5 MG/1
TABLET ORAL
Qty: 45 TABLET | Refills: 5 | Status: SHIPPED | OUTPATIENT
Start: 2019-01-03 | End: 2019-02-02

## 2019-02-04 DIAGNOSIS — F43.9 STRESS: ICD-10-CM

## 2019-02-04 DIAGNOSIS — C91.10 CLL (CHRONIC LYMPHOCYTIC LEUKEMIA) (HCC): ICD-10-CM

## 2019-02-04 DIAGNOSIS — F41.9 ANXIETY: Primary | ICD-10-CM

## 2019-02-04 RX ORDER — ALPRAZOLAM 0.5 MG/1
0.5 TABLET ORAL NIGHTLY PRN
Qty: 30 TABLET | Refills: 2 | Status: SHIPPED | OUTPATIENT
Start: 2019-02-04 | End: 2019-05-06 | Stop reason: SDUPTHER

## 2019-02-18 ENCOUNTER — OFFICE VISIT (OUTPATIENT)
Dept: FAMILY MEDICINE CLINIC | Age: 59
End: 2019-02-18
Payer: COMMERCIAL

## 2019-02-18 VITALS
DIASTOLIC BLOOD PRESSURE: 78 MMHG | RESPIRATION RATE: 14 BRPM | SYSTOLIC BLOOD PRESSURE: 132 MMHG | BODY MASS INDEX: 23.22 KG/M2 | WEIGHT: 136 LBS | HEIGHT: 64 IN | OXYGEN SATURATION: 98 % | HEART RATE: 70 BPM

## 2019-02-18 DIAGNOSIS — C85.90 NON-HODGKIN'S LYMPHOMA, UNSPECIFIED BODY REGION, UNSPECIFIED NON-HODGKIN LYMPHOMA TYPE (HCC): ICD-10-CM

## 2019-02-18 DIAGNOSIS — J30.1 SEASONAL ALLERGIC RHINITIS DUE TO POLLEN: ICD-10-CM

## 2019-02-18 DIAGNOSIS — R53.0 NEOPLASTIC MALIGNANT RELATED FATIGUE: ICD-10-CM

## 2019-02-18 DIAGNOSIS — C91.10 CLL (CHRONIC LYMPHOCYTIC LEUKEMIA) (HCC): ICD-10-CM

## 2019-02-18 DIAGNOSIS — E78.00 PURE HYPERCHOLESTEROLEMIA: Primary | ICD-10-CM

## 2019-02-18 DIAGNOSIS — F41.8 ANXIETY WITH DEPRESSION: ICD-10-CM

## 2019-02-18 PROCEDURE — G8420 CALC BMI NORM PARAMETERS: HCPCS | Performed by: PHYSICIAN ASSISTANT

## 2019-02-18 PROCEDURE — 99214 OFFICE O/P EST MOD 30 MIN: CPT | Performed by: PHYSICIAN ASSISTANT

## 2019-02-18 PROCEDURE — G8482 FLU IMMUNIZE ORDER/ADMIN: HCPCS | Performed by: PHYSICIAN ASSISTANT

## 2019-02-18 PROCEDURE — 3017F COLORECTAL CA SCREEN DOC REV: CPT | Performed by: PHYSICIAN ASSISTANT

## 2019-02-18 PROCEDURE — 1036F TOBACCO NON-USER: CPT | Performed by: PHYSICIAN ASSISTANT

## 2019-02-18 PROCEDURE — G8427 DOCREV CUR MEDS BY ELIG CLIN: HCPCS | Performed by: PHYSICIAN ASSISTANT

## 2019-02-18 RX ORDER — EZETIMIBE 10 MG/1
10 TABLET ORAL DAILY
Qty: 90 TABLET | Refills: 1 | Status: SHIPPED | OUTPATIENT
Start: 2019-02-18 | End: 2019-05-20

## 2019-02-18 ASSESSMENT — ENCOUNTER SYMPTOMS
SHORTNESS OF BREATH: 0
NAUSEA: 0
DIARRHEA: 0
COUGH: 0
WHEEZING: 0
VOMITING: 0

## 2019-02-18 ASSESSMENT — PATIENT HEALTH QUESTIONNAIRE - PHQ9
SUM OF ALL RESPONSES TO PHQ QUESTIONS 1-9: 0
2. FEELING DOWN, DEPRESSED OR HOPELESS: 0
SUM OF ALL RESPONSES TO PHQ9 QUESTIONS 1 & 2: 0
1. LITTLE INTEREST OR PLEASURE IN DOING THINGS: 0
SUM OF ALL RESPONSES TO PHQ QUESTIONS 1-9: 0

## 2019-02-23 ASSESSMENT — ENCOUNTER SYMPTOMS
CHEST TIGHTNESS: 0
TROUBLE SWALLOWING: 0
SORE THROAT: 0
ABDOMINAL PAIN: 0
CONSTIPATION: 0
RHINORRHEA: 0

## 2019-03-30 ENCOUNTER — PATIENT MESSAGE (OUTPATIENT)
Dept: FAMILY MEDICINE CLINIC | Age: 59
End: 2019-03-30

## 2019-04-01 NOTE — TELEPHONE ENCOUNTER
From: Ila Gonzalez  To: TRAVIS Arcos  Sent: 3/30/2019 6:33 PM EDT  Subject: Non-Urgent Medical Question    Nathan Slater Hotter - I forgot to send you a message after I was in for my last visit. I am feeling better - the Claritin and nose spray have helped. Thanks again for all you do for me!! Have a good week!

## 2019-04-15 ENCOUNTER — OFFICE VISIT (OUTPATIENT)
Dept: PRIMARY CARE CLINIC | Age: 59
End: 2019-04-15
Payer: COMMERCIAL

## 2019-04-15 ENCOUNTER — HOSPITAL ENCOUNTER (OUTPATIENT)
Dept: GENERAL RADIOLOGY | Age: 59
Discharge: HOME | End: 2019-04-17
Payer: COMMERCIAL

## 2019-04-15 VITALS
OXYGEN SATURATION: 98 % | BODY MASS INDEX: 23.65 KG/M2 | SYSTOLIC BLOOD PRESSURE: 136 MMHG | TEMPERATURE: 97.9 F | WEIGHT: 137.8 LBS | HEART RATE: 65 BPM | DIASTOLIC BLOOD PRESSURE: 82 MMHG | RESPIRATION RATE: 18 BRPM

## 2019-04-15 DIAGNOSIS — M25.571 PAIN AND SWELLING OF ANKLE, RIGHT: Primary | ICD-10-CM

## 2019-04-15 DIAGNOSIS — M25.471 PAIN AND SWELLING OF ANKLE, RIGHT: ICD-10-CM

## 2019-04-15 DIAGNOSIS — M25.571 PAIN AND SWELLING OF ANKLE, RIGHT: ICD-10-CM

## 2019-04-15 DIAGNOSIS — M25.471 PAIN AND SWELLING OF ANKLE, RIGHT: Primary | ICD-10-CM

## 2019-04-15 PROCEDURE — 73610 X-RAY EXAM OF ANKLE: CPT

## 2019-04-15 PROCEDURE — G8427 DOCREV CUR MEDS BY ELIG CLIN: HCPCS | Performed by: NURSE PRACTITIONER

## 2019-04-15 PROCEDURE — 3017F COLORECTAL CA SCREEN DOC REV: CPT | Performed by: NURSE PRACTITIONER

## 2019-04-15 PROCEDURE — 99213 OFFICE O/P EST LOW 20 MIN: CPT | Performed by: NURSE PRACTITIONER

## 2019-04-15 PROCEDURE — G8420 CALC BMI NORM PARAMETERS: HCPCS | Performed by: NURSE PRACTITIONER

## 2019-04-15 PROCEDURE — 1036F TOBACCO NON-USER: CPT | Performed by: NURSE PRACTITIONER

## 2019-04-15 RX ORDER — METHYLPREDNISOLONE 4 MG/1
TABLET ORAL
Qty: 1 KIT | Refills: 0 | Status: SHIPPED | OUTPATIENT
Start: 2019-04-15 | End: 2019-05-20

## 2019-04-15 ASSESSMENT — ENCOUNTER SYMPTOMS: RESPIRATORY NEGATIVE: 1

## 2019-04-15 NOTE — PROGRESS NOTES
G capsule One capsule bid for cholesterol 60 capsule 5    aspirin 81 MG tablet Take 81 mg by mouth daily. No facility-administered encounter medications on file as of 4/15/2019. Review of Systems   Constitutional: Negative. Respiratory: Negative. Cardiovascular: Negative. Musculoskeletal: Positive for gait problem and joint swelling. Physical Exam   Constitutional: She appears well-developed and well-nourished. HENT:   Head: Normocephalic. Cardiovascular: Normal rate. Pulmonary/Chest: Effort normal.   Musculoskeletal:        Feet:    Right lateral and medial ankle pain and swelling. No point tenderness. No bruising. Pain with rotation and dorsiflexion. No redness or associated warmth. Data reviewed:      ASSESSMENT:   Diagnosis Orders   1. Pain and swelling of ankle, right  XR ANKLE RIGHT (MIN 3 VIEWS)       PLAN:  Return if symptoms worsen or fail to improve.        Orders Placed This Encounter   Medications    methylPREDNISolone (MEDROL, PEEWEE,) 4 MG tablet     Sig: Use as directed     Dispense:  1 kit     Refill:  0     Normal xray  RICE      Electronically signed by SILVANA Esteban CNP on 4/15/19 at 11:54 AM

## 2019-04-15 NOTE — PATIENT INSTRUCTIONS
Patient Education        Joint Pain: Care Instructions  Your Care Instructions    Many people have small aches and pains from overuse or injury to muscles and joints. Joint injuries often happen during sports or recreation, work tasks, or projects around the home. An overuse injury can happen when you put too much stress on a joint or when you do an activity that stresses the joint over and over, such as using the computer or rowing a boat. You can take action at home to help your muscles and joints get better. You should feel better in 1 to 2 weeks, but it can take 3 months or more to heal completely. Follow-up care is a key part of your treatment and safety. Be sure to make and go to all appointments, and call your doctor if you are having problems. It's also a good idea to know your test results and keep a list of the medicines you take. How can you care for yourself at home? · Do not put weight on the injured joint for at least a day or two. · For the first day or two after an injury, do not take hot showers or baths, and do not use hot packs. The heat could make swelling worse. · Put ice or a cold pack on the sore joint for 10 to 20 minutes at a time. Try to do this every 1 to 2 hours for the next 3 days (when you are awake) or until the swelling goes down. Put a thin cloth between the ice and your skin. · Wrap the injury in an elastic bandage. Do not wrap it too tightly because this can cause more swelling. · Prop up the sore joint on a pillow when you ice it or anytime you sit or lie down during the next 3 days. Try to keep it above the level of your heart. This will help reduce swelling. · Take an over-the-counter pain medicine, such as acetaminophen (Tylenol), ibuprofen (Advil, Motrin), or naproxen (Aleve). Read and follow all instructions on the label. · After 1 or 2 days of rest, begin moving the joint gently.  While the joint is still healing, you can begin to exercise using activities that do not strain or hurt the painful joint. When should you call for help? Call your doctor now or seek immediate medical care if:    · You have signs of infection, such as:  ? Increased pain, swelling, warmth, and redness. ? Red streaks leading from the joint. ? A fever.    Watch closely for changes in your health, and be sure to contact your doctor if:    · Your movement or symptoms are not getting better after 1 to 2 weeks of home treatment. Where can you learn more? Go to https://goOutMap.Vidable. org and sign in to your [employer] account. Enter P205 in the [employer] box to learn more about \"Joint Pain: Care Instructions. \"     If you do not have an account, please click on the \"Sign Up Now\" link. Current as of: September 20, 2018  Content Version: 11.9  © 5176-3286 [employer], Incorporated. Care instructions adapted under license by [facility] ([facility]). If you have questions about a medical condition or this instruction, always ask your healthcare professional. [address] any warranty or liability for your use of this information.

## 2019-04-25 ENCOUNTER — PATIENT MESSAGE (OUTPATIENT)
Dept: FAMILY MEDICINE CLINIC | Age: 59
End: 2019-04-25

## 2019-04-25 DIAGNOSIS — J32.4 CHRONIC PANSINUSITIS: ICD-10-CM

## 2019-04-25 DIAGNOSIS — H69.83 DYSFUNCTION OF BOTH EUSTACHIAN TUBES: Primary | ICD-10-CM

## 2019-04-25 RX ORDER — PREDNISONE 20 MG/1
20 TABLET ORAL 2 TIMES DAILY
Qty: 10 TABLET | Refills: 0 | Status: SHIPPED | OUTPATIENT
Start: 2019-04-25 | End: 2019-04-30

## 2019-04-25 RX ORDER — FLUTICASONE PROPIONATE 50 MCG
2 SPRAY, SUSPENSION (ML) NASAL DAILY
Qty: 1 BOTTLE | Refills: 1 | Status: SHIPPED | OUTPATIENT
Start: 2019-04-25 | End: 2021-06-22

## 2019-04-25 NOTE — TELEPHONE ENCOUNTER
From: Quincy Mendiola  To: TRAVIS Peck  Sent: 4/25/2019 3:55 PM EDT  Subject: Non-Urgent Medical Question    Hi Myla Nix - I am having problems with the my ears similar to my last visit. Now the left ear seems to have crackling in it and I am having trouble hearing out of the right one as it seems plugged and right sinus seems full. Any suggestions?

## 2019-05-06 DIAGNOSIS — F41.9 ANXIETY: ICD-10-CM

## 2019-05-06 DIAGNOSIS — C91.10 CLL (CHRONIC LYMPHOCYTIC LEUKEMIA) (HCC): ICD-10-CM

## 2019-05-06 DIAGNOSIS — F43.9 STRESS: ICD-10-CM

## 2019-05-06 RX ORDER — ALPRAZOLAM 0.5 MG/1
0.5 TABLET ORAL NIGHTLY PRN
Qty: 30 TABLET | Refills: 2 | OUTPATIENT
Start: 2019-05-06 | End: 2019-06-05

## 2019-05-06 RX ORDER — ALPRAZOLAM 0.5 MG/1
TABLET ORAL
Qty: 30 TABLET | Refills: 2 | Status: SHIPPED | OUTPATIENT
Start: 2019-05-06 | End: 2019-08-28 | Stop reason: SDUPTHER

## 2019-05-06 NOTE — TELEPHONE ENCOUNTER
Patient called in at 7:30 checking status of med. Advised had not been filled and due to 24 notice med request will not get addressed until 5/7/19. Advised patient to check status tomorrow on refill as provider was not here this evening.

## 2019-05-20 ENCOUNTER — HOSPITAL ENCOUNTER (OUTPATIENT)
Dept: LAB | Age: 59
Discharge: HOME | End: 2019-05-20
Payer: COMMERCIAL

## 2019-05-20 ENCOUNTER — OFFICE VISIT (OUTPATIENT)
Dept: CARDIOLOGY | Age: 59
End: 2019-05-20
Payer: COMMERCIAL

## 2019-05-20 VITALS
WEIGHT: 133.6 LBS | BODY MASS INDEX: 22.81 KG/M2 | DIASTOLIC BLOOD PRESSURE: 70 MMHG | SYSTOLIC BLOOD PRESSURE: 138 MMHG | HEART RATE: 64 BPM | HEIGHT: 64 IN

## 2019-05-20 DIAGNOSIS — R00.2 PALPITATION: ICD-10-CM

## 2019-05-20 DIAGNOSIS — R07.9 CHEST PAIN, UNSPECIFIED TYPE: ICD-10-CM

## 2019-05-20 DIAGNOSIS — C91.10 CLL (CHRONIC LYMPHOCYTIC LEUKEMIA) (HCC): ICD-10-CM

## 2019-05-20 DIAGNOSIS — E78.5 HYPERLIPIDEMIA, UNSPECIFIED HYPERLIPIDEMIA TYPE: Primary | ICD-10-CM

## 2019-05-20 LAB
ABSOLUTE EOS #: 0.1 K/UL (ref 0–0.4)
ABSOLUTE IMMATURE GRANULOCYTE: ABNORMAL K/UL (ref 0–0.3)
ABSOLUTE LYMPH #: 3.5 K/UL (ref 1–4.8)
ABSOLUTE MONO #: 0.5 K/UL (ref 0.1–1.2)
BASOPHILS # BLD: 1 % (ref 0–1)
BASOPHILS ABSOLUTE: 0 K/UL (ref 0–0.2)
DIFFERENTIAL TYPE: ABNORMAL
EOSINOPHILS RELATIVE PERCENT: 1 % (ref 1–7)
HCT VFR BLD CALC: 41.1 % (ref 36–46)
HEMOGLOBIN: 13.1 G/DL (ref 12–16)
IMMATURE GRANULOCYTES: ABNORMAL %
LYMPHOCYTES # BLD: 53 % (ref 16–46)
MCH RBC QN AUTO: 29.2 PG (ref 26–34)
MCHC RBC AUTO-ENTMCNC: 31.8 G/DL (ref 31–37)
MCV RBC AUTO: 91.8 FL (ref 80–100)
MONOCYTES # BLD: 7 % (ref 4–11)
NRBC AUTOMATED: ABNORMAL PER 100 WBC
PDW BLD-RTO: 13.8 % (ref 11–14.5)
PLATELET # BLD: 246 K/UL (ref 140–450)
PLATELET ESTIMATE: ABNORMAL
PMV BLD AUTO: 10.7 FL (ref 6–12)
RBC # BLD: 4.47 M/UL (ref 4–5.2)
RBC # BLD: ABNORMAL 10*6/UL
SEG NEUTROPHILS: 38 % (ref 43–77)
SEGMENTED NEUTROPHILS ABSOLUTE COUNT: 2.4 K/UL (ref 1.8–7.7)
WBC # BLD: 6.5 K/UL (ref 3.5–11)
WBC # BLD: ABNORMAL 10*3/UL

## 2019-05-20 PROCEDURE — 1036F TOBACCO NON-USER: CPT | Performed by: INTERNAL MEDICINE

## 2019-05-20 PROCEDURE — 93000 ELECTROCARDIOGRAM COMPLETE: CPT | Performed by: INTERNAL MEDICINE

## 2019-05-20 PROCEDURE — 36415 COLL VENOUS BLD VENIPUNCTURE: CPT

## 2019-05-20 PROCEDURE — G8420 CALC BMI NORM PARAMETERS: HCPCS | Performed by: INTERNAL MEDICINE

## 2019-05-20 PROCEDURE — 99214 OFFICE O/P EST MOD 30 MIN: CPT | Performed by: INTERNAL MEDICINE

## 2019-05-20 PROCEDURE — G8427 DOCREV CUR MEDS BY ELIG CLIN: HCPCS | Performed by: INTERNAL MEDICINE

## 2019-05-20 PROCEDURE — 3017F COLORECTAL CA SCREEN DOC REV: CPT | Performed by: INTERNAL MEDICINE

## 2019-05-20 PROCEDURE — 85025 COMPLETE CBC W/AUTO DIFF WBC: CPT

## 2019-05-20 NOTE — PROGRESS NOTES
problems  · Gastrointestinal: No abdominal pain, appetite loss, blood in stools. No change in bowel or bladder habits. · Genitourinary: No dysuria, trouble voiding, or hematuria. · Musculoskeletal:  No gait disturbance, No weakness or joint complaints. · Integumentary: No rash or pruritis. · Neurological: No headache, diplopia, change in muscle strength, numbness or tingling. No change in gait, balance, coordination, mood, affect, memory, mentation, behavior. · Psychiatric: No new anxiety or depression. · Endocrine: No temperature intolerance. No excessive thirst, fluid intake, or urination. No tremor. · Hematologic/Lymphatic: No abnormal bruising or bleeding, blood clots or swollen lymph nodes. · Allergic/Immunologic: No nasal congestion or hives. Physical Exam:  /70   Pulse 64   Ht 5' 4\" (1.626 m)   Wt 133 lb 9.6 oz (60.6 kg)   BMI 22.93 kg/m²   Vitals as above. Alert and oriented x 3. No JVD, or carotid bruits. Lungs are clear to auscultation. Heart sounds are regular, normal, no murmurs, clicks, gallops or rubs S1, S2.   Abdomen is soft, no tenderness. Extremities No peripheral edema. EKG: NSR, NL ECG    Meds:    Current Outpatient Medications:     ALPRAZolam (XANAX) 0.5 MG tablet, TAKE ONE TABLET BY MOUTH EVERY NIGHT AS NEEDED FOR ANXIETY OR SLEEP, Disp: 30 tablet, Rfl: 2    fluticasone (FLONASE) 50 MCG/ACT nasal spray, 2 sprays by Each Nare route daily 2 Sprays in each nostril, Disp: 1 Bottle, Rfl: 1    mometasone (NASONEX) 50 MCG/ACT nasal spray, 2 sprays by Nasal route daily, Disp: 3 Inhaler, Rfl: 3    ezetimibe (ZETIA) 10 MG tablet, Take 1 tablet by mouth daily, Disp: 90 tablet, Rfl: 3    IMBRUVICA 140 MG chemo capsule, Take 2 capsules by mouth daily , Disp: , Rfl:     Multiple Vitamins-Minerals (MULTIVITAL PO), Take  by mouth., Disp: , Rfl:     calcium carbonate (OSCAL) 500 MG TABS tablet, Take 500 mg by mouth daily. , Disp: , Rfl:     Vitamin D (CHOLECALCIFEROL) 1000 UNITS CAPS capsule, Take 2,000 Units by mouth daily , Disp: , Rfl:     omega-3 acid ethyl esters (LOVAZA) 1 G capsule, One capsule bid for cholesterol, Disp: 60 capsule, Rfl: 5    aspirin 81 MG tablet, Take 81 mg by mouth daily. , Disp: , Rfl:     Recent Labs:  No results for input(s): CHOL, HDL, TRIG in the last 72 hours. Invalid input(s): CHOLHDLR, LDL, LDLCALCU  No results for input(s): NA, K, CL, CO2, BUN, CREATININE in the last 72 hours. Invalid input(s): CA    Assessment and Plan:    1. -Patient is asymptomatic. 2. -TTE 11/28/14 showed LVEF 68%, mild MR/TR, grade II DD. 3. Treadmill stress test 4/14/15: 13.4 METS and no ischemic changes. 4. 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 On PO chemotherapy. 5. -Hyperlipidemia- ,  on 9/11/17. Today evaluation , . Not on statin after dietary modification. Patient does not want to be on statin. Resume zetia 10mg po qday  6. -RTC 12 months    Thank you for allowing me to participate in the care of this patient, please do not hesitate to call if you have any questions. Griselda Schmitt, [id number] [facility]  [facility]. [facility]  [phone]

## 2019-05-23 ENCOUNTER — TELEPHONE (OUTPATIENT)
Dept: FAMILY MEDICINE CLINIC | Age: 59
End: 2019-05-23

## 2019-05-23 DIAGNOSIS — C91.10 CLL (CHRONIC LYMPHOCYTIC LEUKEMIA) (HCC): Primary | ICD-10-CM

## 2019-05-23 DIAGNOSIS — J02.9 ACUTE PHARYNGITIS, UNSPECIFIED ETIOLOGY: ICD-10-CM

## 2019-05-23 RX ORDER — AZITHROMYCIN 250 MG/1
250 TABLET, FILM COATED ORAL SEE ADMIN INSTRUCTIONS
Qty: 6 TABLET | Refills: 0 | Status: SHIPPED | OUTPATIENT
Start: 2019-05-23 | End: 2019-05-28

## 2019-08-19 ENCOUNTER — OFFICE VISIT (OUTPATIENT)
Dept: FAMILY MEDICINE CLINIC | Age: 59
End: 2019-08-19
Payer: COMMERCIAL

## 2019-08-19 VITALS
WEIGHT: 136.8 LBS | BODY MASS INDEX: 23.35 KG/M2 | HEART RATE: 80 BPM | HEIGHT: 64 IN | SYSTOLIC BLOOD PRESSURE: 134 MMHG | DIASTOLIC BLOOD PRESSURE: 80 MMHG | OXYGEN SATURATION: 98 %

## 2019-08-19 DIAGNOSIS — Z23 NEED FOR SHINGLES VACCINE: Primary | ICD-10-CM

## 2019-08-19 DIAGNOSIS — C91.10 CLL (CHRONIC LYMPHOCYTIC LEUKEMIA) (HCC): ICD-10-CM

## 2019-08-19 DIAGNOSIS — F41.1 GENERALIZED ANXIETY DISORDER: ICD-10-CM

## 2019-08-19 DIAGNOSIS — E78.00 PURE HYPERCHOLESTEROLEMIA: ICD-10-CM

## 2019-08-19 DIAGNOSIS — J32.4 CHRONIC PANSINUSITIS: ICD-10-CM

## 2019-08-19 PROCEDURE — 99214 OFFICE O/P EST MOD 30 MIN: CPT | Performed by: PHYSICIAN ASSISTANT

## 2019-08-19 PROCEDURE — 3017F COLORECTAL CA SCREEN DOC REV: CPT | Performed by: PHYSICIAN ASSISTANT

## 2019-08-19 PROCEDURE — G8420 CALC BMI NORM PARAMETERS: HCPCS | Performed by: PHYSICIAN ASSISTANT

## 2019-08-19 PROCEDURE — G8427 DOCREV CUR MEDS BY ELIG CLIN: HCPCS | Performed by: PHYSICIAN ASSISTANT

## 2019-08-19 PROCEDURE — 1036F TOBACCO NON-USER: CPT | Performed by: PHYSICIAN ASSISTANT

## 2019-08-19 ASSESSMENT — PATIENT HEALTH QUESTIONNAIRE - PHQ9
2. FEELING DOWN, DEPRESSED OR HOPELESS: 0
SUM OF ALL RESPONSES TO PHQ QUESTIONS 1-9: 0
SUM OF ALL RESPONSES TO PHQ9 QUESTIONS 1 & 2: 0
1. LITTLE INTEREST OR PLEASURE IN DOING THINGS: 0
SUM OF ALL RESPONSES TO PHQ QUESTIONS 1-9: 0

## 2019-08-19 ASSESSMENT — ENCOUNTER SYMPTOMS: RESPIRATORY NEGATIVE: 1

## 2019-08-19 NOTE — PROGRESS NOTES
Take 1 tablet by mouth daily 90 tablet 3    IMBRUVICA 140 MG chemo capsule Take 2 capsules by mouth daily       Multiple Vitamins-Minerals (MULTIVITAL PO) Take  by mouth.  calcium carbonate (OSCAL) 500 MG TABS tablet Take 500 mg by mouth daily.  Vitamin D (CHOLECALCIFEROL) 1000 UNITS CAPS capsule Take 2,000 Units by mouth daily       omega-3 acid ethyl esters (LOVAZA) 1 G capsule One capsule bid for cholesterol 60 capsule 5    aspirin 81 MG tablet Take 81 mg by mouth daily.  fluticasone (FLONASE) 50 MCG/ACT nasal spray 2 sprays by Each Nare route daily 2 Sprays in each nostril (Patient not taking: Reported on 8/19/2019) 1 Bottle 1     No current facility-administered medications for this visit. Review of Systems   Constitutional: Positive for fatigue. Negative for appetite change, chills, diaphoresis and fever. HENT: Positive for postnasal drip. Negative for congestion, rhinorrhea, sinus pressure, sinus pain, sore throat and trouble swallowing. Takes claritin prn allergy it helps. Has nasonex. Eyes:        No eye sx. SRAVANTHI 8/19 sees Dr. Lucas Wynn. Respiratory: Negative. Negative for cough, chest tightness, shortness of breath and wheezing. Cardiovascular: Negative. Negative for chest pain and palpitations. Gastrointestinal: Positive for diarrhea. Negative for abdominal distention, abdominal pain, nausea and vomiting. Yesterday had diarrhea is better now. Bowels are usually all over the place with chemo. Genitourinary: Negative. Negative for difficulty urinating and dysuria. Pap mammogram is at [facility]. Musculoskeletal: Positive for arthralgias. Negative for myalgias. Skin: Negative. Negative for rash. Neurological: Negative. Negative for dizziness, weakness, light-headedness, numbness and headaches. Psychiatric/Behavioral: Negative for sleep disturbance. The patient is not nervous/anxious.           Objective:      /80   Pulse

## 2019-08-28 DIAGNOSIS — C91.10 CLL (CHRONIC LYMPHOCYTIC LEUKEMIA) (HCC): ICD-10-CM

## 2019-08-28 DIAGNOSIS — F41.9 ANXIETY: ICD-10-CM

## 2019-08-28 DIAGNOSIS — F43.9 STRESS: ICD-10-CM

## 2019-08-29 RX ORDER — ALPRAZOLAM 0.5 MG/1
TABLET ORAL
Qty: 30 TABLET | Refills: 2 | Status: SHIPPED | OUTPATIENT
Start: 2019-09-04 | End: 2019-11-30 | Stop reason: SDUPTHER

## 2019-08-30 ASSESSMENT — ENCOUNTER SYMPTOMS
ABDOMINAL PAIN: 0
SORE THROAT: 0
DIARRHEA: 1
SHORTNESS OF BREATH: 0
RHINORRHEA: 0
ABDOMINAL DISTENTION: 0
TROUBLE SWALLOWING: 0
COUGH: 0
SINUS PRESSURE: 0
VOMITING: 0
NAUSEA: 0
SINUS PAIN: 0
WHEEZING: 0
CHEST TIGHTNESS: 0

## 2019-09-16 ENCOUNTER — HOSPITAL ENCOUNTER (OUTPATIENT)
Dept: LAB | Age: 59
Discharge: HOME | End: 2019-09-16
Payer: COMMERCIAL

## 2019-09-16 DIAGNOSIS — F41.9 ANXIETY: ICD-10-CM

## 2019-09-16 DIAGNOSIS — C91.10 CLL (CHRONIC LYMPHOCYTIC LEUKEMIA) (HCC): ICD-10-CM

## 2019-09-16 DIAGNOSIS — Z13.29 SCREENING FOR THYROID DISORDER: ICD-10-CM

## 2019-09-16 DIAGNOSIS — E78.00 PURE HYPERCHOLESTEROLEMIA: ICD-10-CM

## 2019-09-16 DIAGNOSIS — R53.0 NEOPLASTIC MALIGNANT RELATED FATIGUE: ICD-10-CM

## 2019-09-16 DIAGNOSIS — C85.90 NON-HODGKIN'S LYMPHOMA, UNSPECIFIED BODY REGION, UNSPECIFIED NON-HODGKIN LYMPHOMA TYPE (HCC): ICD-10-CM

## 2019-09-16 DIAGNOSIS — F32.A DEPRESSION, UNSPECIFIED DEPRESSION TYPE: ICD-10-CM

## 2019-09-16 DIAGNOSIS — Z13.21 ENCOUNTER FOR VITAMIN DEFICIENCY SCREENING: Primary | ICD-10-CM

## 2019-09-16 DIAGNOSIS — E78.49 OTHER HYPERLIPIDEMIA: ICD-10-CM

## 2019-09-16 LAB
ALBUMIN SERPL-MCNC: 4.5 G/DL (ref 3.5–5.2)
ALBUMIN/GLOBULIN RATIO: 1.9 (ref 1–2.5)
ALP BLD-CCNC: 84 U/L (ref 35–104)
ALT SERPL-CCNC: 22 U/L (ref 5–33)
ANION GAP SERPL CALCULATED.3IONS-SCNC: 12 MMOL/L (ref 9–17)
AST SERPL-CCNC: 18 U/L
BILIRUB SERPL-MCNC: 0.52 MG/DL (ref 0.3–1.2)
BUN BLDV-MCNC: 13 MG/DL (ref 6–20)
BUN/CREAT BLD: 19 (ref 9–20)
CALCIUM SERPL-MCNC: 9.8 MG/DL (ref 8.6–10.4)
CHLORIDE BLD-SCNC: 104 MMOL/L (ref 98–107)
CHOLESTEROL, FASTING: 200 MG/DL
CHOLESTEROL/HDL RATIO: 3.2
CO2: 28 MMOL/L (ref 20–31)
CREAT SERPL-MCNC: 0.67 MG/DL (ref 0.5–0.9)
FOLATE: >20 NG/ML
GFR AFRICAN AMERICAN: >60 ML/MIN
GFR NON-AFRICAN AMERICAN: >60 ML/MIN
GFR SERPL CREATININE-BSD FRML MDRD: NORMAL ML/MIN/{1.73_M2}
GFR SERPL CREATININE-BSD FRML MDRD: NORMAL ML/MIN/{1.73_M2}
GLUCOSE BLD-MCNC: 99 MG/DL (ref 70–99)
HDLC SERPL-MCNC: 63 MG/DL
LDL CHOLESTEROL: 123 MG/DL (ref 0–130)
POTASSIUM SERPL-SCNC: 4.6 MMOL/L (ref 3.7–5.3)
SODIUM BLD-SCNC: 144 MMOL/L (ref 135–144)
TOTAL PROTEIN: 6.9 G/DL (ref 6.4–8.3)
TRIGLYCERIDE, FASTING: 69 MG/DL
TSH SERPL DL<=0.05 MIU/L-ACNC: 2.64 MIU/L (ref 0.3–5)
VITAMIN B-12: 934 PG/ML (ref 232–1245)
VITAMIN D 25-HYDROXY: 105 NG/ML (ref 30–100)
VLDLC SERPL CALC-MCNC: ABNORMAL MG/DL (ref 1–30)

## 2019-09-16 PROCEDURE — 82306 VITAMIN D 25 HYDROXY: CPT

## 2019-09-16 PROCEDURE — 80061 LIPID PANEL: CPT

## 2019-09-16 PROCEDURE — 82607 VITAMIN B-12: CPT

## 2019-09-16 PROCEDURE — 36415 COLL VENOUS BLD VENIPUNCTURE: CPT

## 2019-09-16 PROCEDURE — 84443 ASSAY THYROID STIM HORMONE: CPT

## 2019-09-16 PROCEDURE — 80053 COMPREHEN METABOLIC PANEL: CPT

## 2019-09-16 PROCEDURE — 82746 ASSAY OF FOLIC ACID SERUM: CPT

## 2019-09-17 DIAGNOSIS — E78.00 PURE HYPERCHOLESTEROLEMIA: ICD-10-CM

## 2019-09-17 RX ORDER — EZETIMIBE 10 MG/1
TABLET ORAL
Qty: 90 TABLET | Refills: 1 | Status: SHIPPED | OUTPATIENT
Start: 2019-09-17 | End: 2020-03-16

## 2019-09-19 ENCOUNTER — TELEPHONE (OUTPATIENT)
Dept: FAMILY MEDICINE CLINIC | Age: 59
End: 2019-09-19

## 2019-11-19 ENCOUNTER — HOSPITAL ENCOUNTER (OUTPATIENT)
Dept: LAB | Age: 59
Discharge: HOME | End: 2019-11-19
Payer: COMMERCIAL

## 2019-11-19 LAB
ABSOLUTE EOS #: 0.1 K/UL (ref 0–0.4)
ABSOLUTE IMMATURE GRANULOCYTE: NORMAL K/UL (ref 0–0.3)
ABSOLUTE LYMPH #: 2.6 K/UL (ref 1–4.8)
ABSOLUTE MONO #: 0.5 K/UL (ref 0.1–1.2)
BASOPHILS # BLD: 1 % (ref 0–1)
BASOPHILS ABSOLUTE: 0 K/UL (ref 0–0.2)
DIFFERENTIAL TYPE: NORMAL
EOSINOPHILS RELATIVE PERCENT: 1 % (ref 1–7)
HCT VFR BLD CALC: 40 % (ref 36–46)
HEMOGLOBIN: 13.1 G/DL (ref 12–16)
IMMATURE GRANULOCYTES: NORMAL %
LYMPHOCYTES # BLD: 46 % (ref 16–46)
MCH RBC QN AUTO: 29.5 PG (ref 26–34)
MCHC RBC AUTO-ENTMCNC: 32.8 G/DL (ref 31–37)
MCV RBC AUTO: 90 FL (ref 80–100)
MONOCYTES # BLD: 9 % (ref 4–11)
NRBC AUTOMATED: NORMAL PER 100 WBC
PDW BLD-RTO: 13.8 % (ref 11–14.5)
PLATELET # BLD: 229 K/UL (ref 140–450)
PLATELET ESTIMATE: NORMAL
PMV BLD AUTO: 10.5 FL (ref 6–12)
RBC # BLD: 4.44 M/UL (ref 4–5.2)
RBC # BLD: NORMAL 10*6/UL
SEG NEUTROPHILS: 43 % (ref 43–77)
SEGMENTED NEUTROPHILS ABSOLUTE COUNT: 2.5 K/UL (ref 1.8–7.7)
WBC # BLD: 5.7 K/UL (ref 3.5–11)
WBC # BLD: NORMAL 10*3/UL

## 2019-11-19 PROCEDURE — 85025 COMPLETE CBC W/AUTO DIFF WBC: CPT

## 2019-11-19 PROCEDURE — 36415 COLL VENOUS BLD VENIPUNCTURE: CPT

## 2019-11-30 DIAGNOSIS — F41.9 ANXIETY: ICD-10-CM

## 2019-11-30 DIAGNOSIS — F43.9 STRESS: ICD-10-CM

## 2019-11-30 DIAGNOSIS — C91.10 CLL (CHRONIC LYMPHOCYTIC LEUKEMIA) (HCC): ICD-10-CM

## 2019-12-03 RX ORDER — ALPRAZOLAM 0.5 MG/1
TABLET ORAL
Qty: 30 TABLET | Refills: 1 | Status: SHIPPED | OUTPATIENT
Start: 2019-12-03 | End: 2020-01-02

## 2019-12-04 ENCOUNTER — IMMUNIZATION (OUTPATIENT)
Dept: LAB | Age: 59
End: 2019-12-04
Payer: COMMERCIAL

## 2019-12-04 PROCEDURE — 90471 IMMUNIZATION ADMIN: CPT | Performed by: PHYSICIAN ASSISTANT

## 2019-12-04 PROCEDURE — 90686 IIV4 VACC NO PRSV 0.5 ML IM: CPT | Performed by: PHYSICIAN ASSISTANT

## 2019-12-20 ENCOUNTER — OFFICE VISIT (OUTPATIENT)
Dept: PRIMARY CARE CLINIC | Age: 59
End: 2019-12-20
Payer: COMMERCIAL

## 2019-12-20 VITALS
SYSTOLIC BLOOD PRESSURE: 124 MMHG | BODY MASS INDEX: 22.88 KG/M2 | HEART RATE: 64 BPM | WEIGHT: 134 LBS | OXYGEN SATURATION: 98 % | TEMPERATURE: 97 F | DIASTOLIC BLOOD PRESSURE: 70 MMHG | HEIGHT: 64 IN

## 2019-12-20 DIAGNOSIS — J06.9 UPPER RESPIRATORY TRACT INFECTION, UNSPECIFIED TYPE: Primary | ICD-10-CM

## 2019-12-20 PROCEDURE — 1036F TOBACCO NON-USER: CPT | Performed by: PHYSICIAN ASSISTANT

## 2019-12-20 PROCEDURE — 99213 OFFICE O/P EST LOW 20 MIN: CPT | Performed by: PHYSICIAN ASSISTANT

## 2019-12-20 PROCEDURE — 3017F COLORECTAL CA SCREEN DOC REV: CPT | Performed by: PHYSICIAN ASSISTANT

## 2019-12-20 PROCEDURE — G8482 FLU IMMUNIZE ORDER/ADMIN: HCPCS | Performed by: PHYSICIAN ASSISTANT

## 2019-12-20 PROCEDURE — G8428 CUR MEDS NOT DOCUMENT: HCPCS | Performed by: PHYSICIAN ASSISTANT

## 2019-12-20 PROCEDURE — G8420 CALC BMI NORM PARAMETERS: HCPCS | Performed by: PHYSICIAN ASSISTANT

## 2019-12-20 RX ORDER — AMOXICILLIN 500 MG/1
500 CAPSULE ORAL 2 TIMES DAILY
COMMUNITY
Start: 2019-12-15 | End: 2019-12-25

## 2019-12-20 RX ORDER — AZITHROMYCIN 250 MG/1
250 TABLET, FILM COATED ORAL SEE ADMIN INSTRUCTIONS
Qty: 6 TABLET | Refills: 0 | Status: SHIPPED | OUTPATIENT
Start: 2019-12-20 | End: 2019-12-25

## 2019-12-20 RX ORDER — BENZONATATE 200 MG/1
200 CAPSULE ORAL 3 TIMES DAILY PRN
Qty: 15 CAPSULE | Refills: 1 | Status: SHIPPED | OUTPATIENT
Start: 2019-12-20 | End: 2019-12-27

## 2019-12-20 ASSESSMENT — ENCOUNTER SYMPTOMS
GASTROINTESTINAL NEGATIVE: 1
SHORTNESS OF BREATH: 0
RHINORRHEA: 0
WHEEZING: 1
COUGH: 1
SORE THROAT: 0

## 2020-01-09 RX ORDER — MOMETASONE FUROATE 50 UG/1
SPRAY, METERED NASAL
Qty: 51 G | Refills: 5 | Status: SHIPPED | OUTPATIENT
Start: 2020-01-09

## 2020-02-19 ENCOUNTER — OFFICE VISIT (OUTPATIENT)
Dept: FAMILY MEDICINE CLINIC | Age: 60
End: 2020-02-19
Payer: COMMERCIAL

## 2020-02-19 VITALS
BODY MASS INDEX: 22.88 KG/M2 | WEIGHT: 134 LBS | DIASTOLIC BLOOD PRESSURE: 62 MMHG | SYSTOLIC BLOOD PRESSURE: 138 MMHG | HEIGHT: 64 IN | OXYGEN SATURATION: 98 % | HEART RATE: 78 BPM

## 2020-02-19 PROCEDURE — 3017F COLORECTAL CA SCREEN DOC REV: CPT | Performed by: PHYSICIAN ASSISTANT

## 2020-02-19 PROCEDURE — 99214 OFFICE O/P EST MOD 30 MIN: CPT | Performed by: PHYSICIAN ASSISTANT

## 2020-02-19 PROCEDURE — G8427 DOCREV CUR MEDS BY ELIG CLIN: HCPCS | Performed by: PHYSICIAN ASSISTANT

## 2020-02-19 PROCEDURE — 90471 IMMUNIZATION ADMIN: CPT | Performed by: PHYSICIAN ASSISTANT

## 2020-02-19 PROCEDURE — G8482 FLU IMMUNIZE ORDER/ADMIN: HCPCS | Performed by: PHYSICIAN ASSISTANT

## 2020-02-19 PROCEDURE — 1036F TOBACCO NON-USER: CPT | Performed by: PHYSICIAN ASSISTANT

## 2020-02-19 PROCEDURE — G8420 CALC BMI NORM PARAMETERS: HCPCS | Performed by: PHYSICIAN ASSISTANT

## 2020-02-19 PROCEDURE — 90670 PCV13 VACCINE IM: CPT | Performed by: PHYSICIAN ASSISTANT

## 2020-02-19 ASSESSMENT — ENCOUNTER SYMPTOMS
NAUSEA: 0
SHORTNESS OF BREATH: 0
VOMITING: 0
DIARRHEA: 0
COUGH: 0
WHEEZING: 0

## 2020-02-19 NOTE — PROGRESS NOTES
[facility]    Subjective:      Patient ID: Reed Campuzano is a 61 y.o. y.o. female. Patient is seen for six month follow up . She is doing pretty well and just saw [facility] yesterday. She is stable things look good and will keep the course and will see her in six months. No recent illness did in December. Has been really tired not sure why. Has one night a week mind will not stop and feels wired. Is a little anxious. Worries about things but not daily. Gets tired after eats. Can fall asleep easily if sits down and tries to take naps on weekends. Does snore. Can awaken herself with snoring. No gasping. Sometimes mouth is dry. In am no am fogginess or HA. Sometimes restless sleep not always. Nl dreaming. Past Medical History:   Diagnosis Date    Allergic rhinitis     Anxiety     Chronic sinusitis     CLL (chronic lymphocytic leukemia) ([facility] [address]     (follows with Dr. Veronika Sunshine, Oncology Dept., [address] [place] [address],[address]).  Depression     Hemorrhoids     (internal and external)    Hyperlipidemia     Kidney stones     history of     Non Hodgkin's lymphoma ([facility] [address]     1998 remission for 10 years currently under [facility]  care    Proctitis 2008       Past Surgical History:   Procedure Laterality Date    COLONOSCOPY  02-    (Dr. Joya Metzger) - External and internal hemorrhoids, nonspecific proctitis. (Path: Reactive change, mild).     COLONOSCOPY  3/30/15    internal hemorrhoids [facility] Dr. Jonathon Camacho LYMPH NODE BIOPSY      neck     TUBAL LIGATION         Family History   Problem Relation Age of Onset    Heart Disease Mother     Heart Attack Brother     Coronary Art Dis Brother         (with stent placement)    Heart Disease Brother     Coronary Art Dis Sister         (CABG)    Heart Disease Sister     High Blood Pressure Sister     Coronary Art Dis Brother         (with stent placement)    Heart Attack Brother     Heart Disease Brother     Heart Disease Maternal Grandmother     Heart Disease Maternal Grandfather     High Cholesterol Daughter        Allergies   Allergen Reactions    Latex Rash    Codeine Nausea Only and Nausea And Vomiting       Current Outpatient Medications   Medication Sig Dispense Refill    mometasone (NASONEX) 50 MCG/ACT nasal spray instill 2 sprays into each nostril once daily 51 g 5    ezetimibe (ZETIA) 10 MG tablet TAKE ONE TABLET BY MOUTH DAILY 90 tablet 1    zoster recombinant adjuvanted vaccine (SHINGRIX) 50 MCG/0.5ML SUSR injection Inject 0.5 mLs into the muscle See Admin Instructions 1 dose now and repeat in 2-6 months 0.5 mL 1    fluticasone (FLONASE) 50 MCG/ACT nasal spray 2 sprays by Each Nare route daily 2 Sprays in each nostril 1 Bottle 1    IMBRUVICA 140 MG chemo capsule Take 2 capsules by mouth daily       Multiple Vitamins-Minerals (MULTIVITAL PO) Take  by mouth.  calcium carbonate (OSCAL) 500 MG TABS tablet Take 500 mg by mouth daily.  Vitamin D (CHOLECALCIFEROL) 1000 UNITS CAPS capsule Take 2,000 Units by mouth daily       omega-3 acid ethyl esters (LOVAZA) 1 G capsule One capsule bid for cholesterol 60 capsule 5    aspirin 81 MG tablet Take 81 mg by mouth daily. No current facility-administered medications for this visit. Review of Systems   Constitutional: Positive for fatigue. Negative for appetite change, chills and fever. Does yoga and treadmill and still tired. Respiratory: Negative for cough, shortness of breath and wheezing. Cardiovascular: Negative for chest pain and palpitations. Gastrointestinal: Negative for diarrhea, nausea and vomiting. Genitourinary: Negative for difficulty urinating. Musculoskeletal: Negative for myalgias. Skin: Negative for rash. Neurological: Negative for dizziness, light-headedness, numbness and headaches. Psychiatric/Behavioral: Negative for sleep disturbance. The patient is nervous/anxious.          Does not take anything to help normal.         Thought Content: Thought content normal.         Judgment: Judgment normal.           Assessment & Plan:     1. Need for vaccination with 13-polyvalent pneumococcal conjugate vaccine    - Pneumococcal conjugate vaccine 13-valent    2. Snoring    - Baseline Diagnostic Sleep Study; Future  - [address]    3. Fatigue, unspecified type    - Baseline Diagnostic Sleep Study; Future  - [address]    4. Chronic pansinusitis  Stable currently    5. Generalized anxiety disorder  Increasing some    6.  Non-Hodgkin lymphoma of lymph nodes of neck, unspecified non-Hodgkin lymphoma type ([facility] [address]  Stable follows with [facility] oncology every six months    Recommended B complex vitamin  Will order Shingrix for her  She will update the Pneumovax today  Labs due orders given to patient  Based on symptoms recommended a sleep study to be safe  She will be notified of all results  Further treatment based on results  Discussed sleep apnea and sleep disorders with patient  Good nutrition hydration  Discussed coping mechanisms  Discussed melatonin versus Tylenol PM at night as needed  Follow-up with me in 3 months sooner if problems        TRAVIS Baird  2/19/2020 11:45 AM    (Pleasenote that portions of this note were completed with a voice recognition program.Efforts were made to edit the dictations but occasionally words are mis-transcribed.)

## 2020-02-24 ENCOUNTER — TELEPHONE (OUTPATIENT)
Dept: SLEEP CENTER | Age: 60
End: 2020-02-24

## 2020-03-16 RX ORDER — EZETIMIBE 10 MG/1
TABLET ORAL
Qty: 90 TABLET | Refills: 2 | Status: SHIPPED | OUTPATIENT
Start: 2020-03-16 | End: 2020-12-23

## 2020-04-01 ENCOUNTER — PATIENT MESSAGE (OUTPATIENT)
Dept: FAMILY MEDICINE CLINIC | Age: 60
End: 2020-04-01

## 2020-04-01 RX ORDER — ALPRAZOLAM 0.5 MG/1
TABLET ORAL
Qty: 30 TABLET | Refills: 2 | Status: SHIPPED | OUTPATIENT
Start: 2020-04-01 | End: 2020-04-01 | Stop reason: CLARIF

## 2020-04-01 RX ORDER — ALPRAZOLAM 0.5 MG/1
0.5 TABLET ORAL NIGHTLY PRN
Qty: 30 TABLET | Refills: 2 | Status: SHIPPED | OUTPATIENT
Start: 2020-04-01 | End: 2020-06-26 | Stop reason: SDUPTHER

## 2020-04-01 NOTE — TELEPHONE ENCOUNTER
From: Pito Quevedo  To: TRAVIS Lepe  Sent: 4/1/2020 9:21 AM EDT  Subject: Prescription Question    I need my Xanax/Alprazolam refilled, but I don't see it under my prescription list.  I get it at [address] [place] -0.5 mg. \"One Tablet by mouth every evening as need for anxiety or sleep. \"  Can I get this refill -? I am almost out. Thank you.  Berlin Gardner

## 2020-05-20 ENCOUNTER — TELEMEDICINE (OUTPATIENT)
Dept: CARDIOLOGY | Age: 60
End: 2020-05-20
Payer: COMMERCIAL

## 2020-05-20 PROCEDURE — G8428 CUR MEDS NOT DOCUMENT: HCPCS | Performed by: INTERNAL MEDICINE

## 2020-05-20 PROCEDURE — G8420 CALC BMI NORM PARAMETERS: HCPCS | Performed by: INTERNAL MEDICINE

## 2020-05-20 PROCEDURE — 1036F TOBACCO NON-USER: CPT | Performed by: INTERNAL MEDICINE

## 2020-05-20 PROCEDURE — 3017F COLORECTAL CA SCREEN DOC REV: CPT | Performed by: INTERNAL MEDICINE

## 2020-05-20 PROCEDURE — 99213 OFFICE O/P EST LOW 20 MIN: CPT | Performed by: INTERNAL MEDICINE

## 2020-05-20 NOTE — PROGRESS NOTES
2020    TELEHEALTH EVALUATION -- Audio/Visual (During PBHWE-31 public health emergency)    HPI:    Dago Quinn (:  1960) has requested an audio/video evaluation for the following concern(s):    Patient is very active. Walk every day and yoga every other day. She denies any chest pain or dyspnea. /85    Review of Systems     ROS negative for bleeding, headache, vision issues, N/V/D, joint aches or pain    Prior to Visit Medications    Medication Sig Taking? Authorizing Provider   ezetimibe (ZETIA) 10 MG tablet TAKE ONE TABLET BY MOUTH DAILY  TRAVIS Gregorio   mometasone (NASONEX) 50 MCG/ACT nasal spray instill 2 sprays into each nostril once daily  TRAVIS Reagan   zoster recombinant adjuvanted vaccine [facility]) 50 MCG/0.5ML SUSR injection Inject 0.5 mLs into the muscle See Admin Instructions 1 dose now and repeat in 2-6 months  TRAVIS Reagan   fluticasone [facility]) 50 MCG/ACT nasal spray 2 sprays by Each Nare route daily 2 Sprays in each nostril  TRAVIS Gregorio   IMBRUVICA 140 MG chemo capsule Take 2 capsules by mouth daily   Historical Provider, MD   Multiple Vitamins-Minerals (MULTIVITAL PO) Take  by mouth. Historical Provider, MD   calcium carbonate (OSCAL) 500 MG TABS tablet Take 500 mg by mouth daily. Historical Provider, MD   Vitamin D (CHOLECALCIFEROL) 1000 UNITS CAPS capsule Take 2,000 Units by mouth daily   Historical Provider, MD   omega-3 acid ethyl esters (LOVAZA) 1 G capsule One capsule bid for cholesterol  TRAVIS Gregorio   aspirin 81 MG tablet Take 81 mg by mouth daily. Historical Provider, MD       Social History     Tobacco Use    Smoking status: Never Smoker    Smokeless tobacco: Never Used   Substance Use Topics    Alcohol use: No    Drug use: No            PHYSICAL EXAMINATION: not done  ASSESSMENT/PLAN:    -Patient is asymptomatic. -TTE 14 showed LVEF 68%, mild MR/TR, grade II DD.  Treadmill stress test 4/14/15: 13. 4 METS and no ischemic changes. 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 On PO chemotherapy.  -Hyperlipidemia- , , TG 69 on 9/16/19. Repeat lipid panel to decide need for low dose statin. Continue zetia  -Continue diet and exercise. -Monitor BP.   -RTC 6 months     Carmel Cho is a 61 y.o. female being evaluated by a Virtual Visit (video visit) encounter to address concerns as mentioned above. A caregiver was present when appropriate. Due to this being a TeleHealth encounter (During XSRRW-23 public health emergency), evaluation of the following organ systems was limited: Vitals/Constitutional/EENT/Resp/CV/GI//MS/Neuro/Skin/Heme-Lymph-Imm. Pursuant to the emergency declaration under the [address] [place] [address], [address] [place] [address] authority and the [employer] and [employer] Act, this Virtual Visit was conducted with patient's (and/or legal guardian's) consent, to reduce the patient's risk of exposure to COVID-19 and provide necessary medical care. The patient (and/or legal guardian) has also been advised to contact this office for worsening conditions or problems, and seek emergency medical treatment and/or call 911 if deemed necessary. Patient identification was verified at the start of the visit: Yes    Total time spent on this encounter: 15-20 minutes    Services were provided through a video synchronous discussion virtually to substitute for in-person clinic visit. Patient and provider were located at their individual homes. --Herbert Nettles MD on 5/20/2020 at 12:38 PM    An electronic signature was used to authenticate this note.

## 2020-05-21 ENCOUNTER — HOSPITAL ENCOUNTER (OUTPATIENT)
Dept: LAB | Age: 60
Discharge: HOME | End: 2020-05-21
Payer: COMMERCIAL

## 2020-05-21 LAB
ALBUMIN SERPL-MCNC: 4.4 G/DL (ref 3.5–5.2)
ALBUMIN/GLOBULIN RATIO: 2 (ref 1–2.5)
ALP BLD-CCNC: 73 U/L (ref 35–104)
ALT SERPL-CCNC: 25 U/L (ref 5–33)
ANION GAP SERPL CALCULATED.3IONS-SCNC: 9 MMOL/L (ref 9–17)
AST SERPL-CCNC: 22 U/L
BILIRUB SERPL-MCNC: 0.42 MG/DL (ref 0.3–1.2)
BUN BLDV-MCNC: 18 MG/DL (ref 8–23)
BUN/CREAT BLD: 30 (ref 9–20)
CALCIUM SERPL-MCNC: 9.4 MG/DL (ref 8.6–10.4)
CHLORIDE BLD-SCNC: 105 MMOL/L (ref 98–107)
CHOLESTEROL/HDL RATIO: 2.6
CHOLESTEROL: 193 MG/DL
CO2: 28 MMOL/L (ref 20–31)
CREAT SERPL-MCNC: 0.6 MG/DL (ref 0.5–0.9)
FOLATE: 18.4 NG/ML
GFR AFRICAN AMERICAN: >60 ML/MIN
GFR NON-AFRICAN AMERICAN: >60 ML/MIN
GFR SERPL CREATININE-BSD FRML MDRD: ABNORMAL ML/MIN/{1.73_M2}
GFR SERPL CREATININE-BSD FRML MDRD: ABNORMAL ML/MIN/{1.73_M2}
GLUCOSE BLD-MCNC: 100 MG/DL (ref 70–99)
HDLC SERPL-MCNC: 75 MG/DL
LDL CHOLESTEROL: 108 MG/DL (ref 0–130)
POTASSIUM SERPL-SCNC: 4.3 MMOL/L (ref 3.7–5.3)
SODIUM BLD-SCNC: 142 MMOL/L (ref 135–144)
TOTAL PROTEIN: 6.6 G/DL (ref 6.4–8.3)
TRIGL SERPL-MCNC: 48 MG/DL
TSH SERPL DL<=0.05 MIU/L-ACNC: 3.06 MIU/L (ref 0.3–5)
VITAMIN B-12: 727 PG/ML (ref 232–1245)
VITAMIN D 25-HYDROXY: 80.6 NG/ML (ref 30–100)
VLDLC SERPL CALC-MCNC: NORMAL MG/DL (ref 1–30)

## 2020-05-21 PROCEDURE — 84443 ASSAY THYROID STIM HORMONE: CPT

## 2020-05-21 PROCEDURE — 82746 ASSAY OF FOLIC ACID SERUM: CPT

## 2020-05-21 PROCEDURE — 82306 VITAMIN D 25 HYDROXY: CPT

## 2020-05-21 PROCEDURE — 80053 COMPREHEN METABOLIC PANEL: CPT

## 2020-05-21 PROCEDURE — 80061 LIPID PANEL: CPT

## 2020-05-21 PROCEDURE — 36415 COLL VENOUS BLD VENIPUNCTURE: CPT

## 2020-05-21 PROCEDURE — 82607 VITAMIN B-12: CPT

## 2020-06-26 RX ORDER — ALPRAZOLAM 0.5 MG/1
0.5 TABLET ORAL NIGHTLY PRN
Qty: 30 TABLET | Refills: 2 | Status: SHIPPED | OUTPATIENT
Start: 2020-07-03 | End: 2020-09-25

## 2020-08-14 ENCOUNTER — HOSPITAL ENCOUNTER (OUTPATIENT)
Dept: LAB | Age: 60
Discharge: HOME | End: 2020-08-14
Payer: COMMERCIAL

## 2020-08-14 LAB
ABSOLUTE EOS #: 0.03 K/UL (ref 0–0.44)
ABSOLUTE IMMATURE GRANULOCYTE: <0.03 K/UL (ref 0–0.3)
ABSOLUTE LYMPH #: 2.59 K/UL (ref 1.1–3.7)
ABSOLUTE MONO #: 0.49 K/UL (ref 0.1–1.2)
ALBUMIN SERPL-MCNC: 4.4 G/DL (ref 3.5–5.2)
ALBUMIN/GLOBULIN RATIO: 1.8 (ref 1–2.5)
ALP BLD-CCNC: 69 U/L (ref 35–104)
ALT SERPL-CCNC: 18 U/L (ref 5–33)
ANION GAP SERPL CALCULATED.3IONS-SCNC: 10 MMOL/L (ref 9–17)
AST SERPL-CCNC: 21 U/L
BASOPHILS # BLD: 1 % (ref 0–2)
BASOPHILS ABSOLUTE: 0.03 K/UL (ref 0–0.2)
BILIRUB SERPL-MCNC: 0.32 MG/DL (ref 0.3–1.2)
BUN BLDV-MCNC: 13 MG/DL (ref 8–23)
BUN/CREAT BLD: 20 (ref 9–20)
CALCIUM SERPL-MCNC: 9.7 MG/DL (ref 8.6–10.4)
CHLORIDE BLD-SCNC: 105 MMOL/L (ref 98–107)
CO2: 27 MMOL/L (ref 20–31)
CREAT SERPL-MCNC: 0.65 MG/DL (ref 0.5–0.9)
DIFFERENTIAL TYPE: NORMAL
EOSINOPHILS RELATIVE PERCENT: 1 % (ref 1–4)
GFR AFRICAN AMERICAN: >60 ML/MIN
GFR NON-AFRICAN AMERICAN: >60 ML/MIN
GFR SERPL CREATININE-BSD FRML MDRD: NORMAL ML/MIN/{1.73_M2}
GFR SERPL CREATININE-BSD FRML MDRD: NORMAL ML/MIN/{1.73_M2}
GLUCOSE BLD-MCNC: 91 MG/DL (ref 70–99)
HCT VFR BLD CALC: 40.4 % (ref 36.3–47.1)
HEMOGLOBIN: 13.4 G/DL (ref 11.9–15.1)
IMMATURE GRANULOCYTES: 0 %
LACTATE DEHYDROGENASE: 307 U/L (ref 135–214)
LYMPHOCYTES # BLD: 43 % (ref 24–43)
MCH RBC QN AUTO: 29.8 PG (ref 25.2–33.5)
MCHC RBC AUTO-ENTMCNC: 33.2 G/DL (ref 25.2–33.5)
MCV RBC AUTO: 89.8 FL (ref 82.6–102.9)
MONOCYTES # BLD: 8 % (ref 3–12)
NRBC AUTOMATED: 0 PER 100 WBC
PDW BLD-RTO: 13.5 % (ref 11.8–14.4)
PLATELET # BLD: 222 K/UL (ref 138–453)
PLATELET ESTIMATE: NORMAL
PMV BLD AUTO: 12.6 FL (ref 8.1–13.5)
POTASSIUM SERPL-SCNC: 4.3 MMOL/L (ref 3.7–5.3)
RBC # BLD: 4.5 M/UL (ref 3.95–5.11)
RBC # BLD: NORMAL 10*6/UL
SEG NEUTROPHILS: 47 % (ref 36–65)
SEGMENTED NEUTROPHILS ABSOLUTE COUNT: 2.84 K/UL (ref 1.5–8.1)
SODIUM BLD-SCNC: 142 MMOL/L (ref 135–144)
TOTAL PROTEIN: 6.8 G/DL (ref 6.4–8.3)
WBC # BLD: 6 K/UL (ref 3.5–11.3)
WBC # BLD: NORMAL 10*3/UL

## 2020-08-14 PROCEDURE — 85025 COMPLETE CBC W/AUTO DIFF WBC: CPT

## 2020-08-14 PROCEDURE — 36415 COLL VENOUS BLD VENIPUNCTURE: CPT

## 2020-08-14 PROCEDURE — 83615 LACTATE (LD) (LDH) ENZYME: CPT

## 2020-08-14 PROCEDURE — 80053 COMPREHEN METABOLIC PANEL: CPT

## 2020-09-25 RX ORDER — ALPRAZOLAM 0.5 MG/1
TABLET ORAL
Qty: 30 TABLET | Refills: 1 | Status: SHIPPED | OUTPATIENT
Start: 2020-10-02 | End: 2020-11-30

## 2020-10-08 ENCOUNTER — IMMUNIZATION (OUTPATIENT)
Dept: LAB | Age: 60
End: 2020-10-08
Payer: COMMERCIAL

## 2020-10-08 PROCEDURE — 90686 IIV4 VACC NO PRSV 0.5 ML IM: CPT | Performed by: PHYSICIAN ASSISTANT

## 2020-10-08 PROCEDURE — 90471 IMMUNIZATION ADMIN: CPT | Performed by: PHYSICIAN ASSISTANT

## 2020-11-30 RX ORDER — ALPRAZOLAM 0.5 MG/1
TABLET ORAL
Qty: 30 TABLET | Refills: 2 | Status: SHIPPED | OUTPATIENT
Start: 2020-12-02 | End: 2021-03-04 | Stop reason: SDUPTHER

## 2020-12-16 ENCOUNTER — TELEMEDICINE (OUTPATIENT)
Dept: CARDIOLOGY | Age: 60
End: 2020-12-16
Payer: COMMERCIAL

## 2020-12-16 PROCEDURE — 3017F COLORECTAL CA SCREEN DOC REV: CPT | Performed by: INTERNAL MEDICINE

## 2020-12-16 PROCEDURE — 99214 OFFICE O/P EST MOD 30 MIN: CPT | Performed by: INTERNAL MEDICINE

## 2020-12-16 PROCEDURE — G8482 FLU IMMUNIZE ORDER/ADMIN: HCPCS | Performed by: INTERNAL MEDICINE

## 2020-12-16 PROCEDURE — 1036F TOBACCO NON-USER: CPT | Performed by: INTERNAL MEDICINE

## 2020-12-16 PROCEDURE — G8420 CALC BMI NORM PARAMETERS: HCPCS | Performed by: INTERNAL MEDICINE

## 2020-12-16 PROCEDURE — G8427 DOCREV CUR MEDS BY ELIG CLIN: HCPCS | Performed by: INTERNAL MEDICINE

## 2020-12-16 RX ORDER — LOSARTAN POTASSIUM 25 MG/1
25 TABLET ORAL DAILY
Qty: 90 TABLET | Refills: 1 | Status: SHIPPED | OUTPATIENT
Start: 2020-12-16 | End: 2021-06-18 | Stop reason: SDUPTHER

## 2020-12-16 NOTE — PROGRESS NOTES
2020    TELEHEALTH EVALUATION -- Audio/Visual (During HNBWY-54 public health emergency)    HPI:    John Kelly (:  1960) has requested an audio/video evaluation for the following concern(s):COVID 19     Stable from cardiac standpoint, denies any chest pain, shortness of breath, orthopnea or lower extremity edema. Reports that her blood pressure has been running persistently high at home ranging 1 30-1 60 most of the time. She is functionally active otherwise and denies any exertional symptoms    Review of Systems     ROS negative for bleeding, headache, vision issues, N/V/D, joint aches or pain    Prior to Visit Medications    Medication Sig Taking? Authorizing Provider   ALPRAZolam Alfreida Bottoms) 0.5 MG tablet TAKE ONE TABLET BY MOUTH AT BEDTIME AS NEEDED FOR SLEEP FOR UP TO 30 DAYS  [facility]   ezetimibe (ZETIA) 10 MG tablet TAKE ONE TABLET BY MOUTH DAILY  TRAVIS Sorenson   mometasone (NASONEX) 50 MCG/ACT nasal spray instill 2 sprays into each nostril once daily  TRAVIS Sorenson   zoster recombinant adjuvanted vaccine [facility]) 50 MCG/0.5ML SUSR injection Inject 0.5 mLs into the muscle See Admin Instructions 1 dose now and repeat in 2-6 months  TRAVIS Sorenson   fluticasone (FLONASE) 50 MCG/ACT nasal spray 2 sprays by Each Nare route daily 2 Sprays in each nostril  [facility]   IMBRUVICA 140 MG chemo capsule Take 2 capsules by mouth daily   Historical Provider, MD   Multiple Vitamins-Minerals (MULTIVITAL PO) Take  by mouth. Historical Provider, MD   calcium carbonate (OSCAL) 500 MG TABS tablet Take 500 mg by mouth daily. Historical Provider, MD   Vitamin D (CHOLECALCIFEROL) 1000 UNITS CAPS capsule Take 2,000 Units by mouth daily   Historical Provider, MD   omega-3 acid ethyl esters (LOVAZA) 1 G capsule One capsule bid for cholesterol  [facility], [facility]   aspirin 81 MG tablet Take 81 mg by mouth daily.   Historical Provider, MD       Social History     Tobacco Use    Smoking status: Never Smoker    Smokeless tobacco: Never Used   Substance Use Topics    Alcohol use: No    Drug use: No            PHYSICAL EXAMINATION: not done      ASSESSMENT/PLAN:  - Hypertension, persistently elevated readings at home, likely related to Imbruvica therapy, will start low-dose losartan. Continue ambulatory blood pressure monitoring. Counseled regarding low-salt diet. -TTE 11/28/14 showed LVEF 68%, mild MR/TR, grade II DD. Treadmill stress test 4/14/15: 13.4 METS and no ischemic changes. 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 On PO chemotherapy.    -Hyperlipidemia, normal lipid panel in may 2020, Continue zetia    -Continue diet and exercise. -RTC 6 months       Fela Lackey is a 61 y.o. female being evaluated by a Virtual Visit (video visit) encounter to address concerns as mentioned above. A caregiver was present when appropriate. Due to this being a TeleHealth encounter (During IUSJK-14 public health emergency), evaluation of the following organ systems was limited: Vitals/Constitutional/EENT/Resp/CV/GI//MS/Neuro/Skin/Heme-Lymph-Imm. Pursuant to the emergency declaration under the [facility], [address] [place] [address] authority and the [employer] and [employer] Act, this Virtual Visit was conducted with patient's (and/or legal guardian's) consent, to reduce the patient's risk of exposure to COVID-19 and provide necessary medical care. The patient (and/or legal guardian) has also been advised to contact this office for worsening conditions or problems, and seek emergency medical treatment and/or call 911 if deemed necessary. Patient identification was verified at the start of the visit: Yes    Total time spent on this encounter: 15-20 minutes    Services were provided through a video synchronous discussion virtually to substitute for in-person clinic visit.  Patient and provider were located at their individual homes. --Rabia Menendez MD on 12/16/2020 at 10:53 AM    An electronic signature was used to authenticate this note.

## 2020-12-23 DIAGNOSIS — E78.00 PURE HYPERCHOLESTEROLEMIA: ICD-10-CM

## 2020-12-23 RX ORDER — EZETIMIBE 10 MG/1
TABLET ORAL
Qty: 30 TABLET | Refills: 0 | OUTPATIENT
Start: 2020-12-23

## 2020-12-23 RX ORDER — EZETIMIBE 10 MG/1
TABLET ORAL
Qty: 90 TABLET | Refills: 1 | Status: SHIPPED | OUTPATIENT
Start: 2020-12-23 | End: 2021-04-02 | Stop reason: SDUPTHER

## 2020-12-23 NOTE — TELEPHONE ENCOUNTER
Patient only has one pill left and will need this today.   James Schultz called requesting a refill of the below medication which has been pended for you:     Requested Prescriptions     Pending Prescriptions Disp Refills    ezetimibe (ZETIA) 10 MG tablet 30 tablet 0     Sig: TAKE ONE TABLET BY MOUTH DAILY       Last Appointment Date: 2/19/2020  Next Appointment Date: Visit date not found    Allergies   Allergen Reactions    Latex Rash    Codeine Nausea Only and Nausea And Vomiting

## 2021-03-04 DIAGNOSIS — F43.9 STRESS: ICD-10-CM

## 2021-03-04 DIAGNOSIS — F41.9 ANXIETY: ICD-10-CM

## 2021-03-04 RX ORDER — ALPRAZOLAM 0.5 MG/1
TABLET ORAL
Qty: 30 TABLET | Refills: 2 | OUTPATIENT
Start: 2021-03-04 | End: 2021-04-03

## 2021-03-04 NOTE — TELEPHONE ENCOUNTER
Last Appt:  2/19/2020  Next Appt:   Visit date not found  Med verified in Epic    Patient states she has seen Dr. Leighton Kirk, was his patient before and would like to know if she could just go back to him, could you please review for refill and advise for an appointment.

## 2021-03-05 RX ORDER — ALPRAZOLAM 0.5 MG/1
TABLET ORAL
Qty: 30 TABLET | Refills: 0 | Status: SHIPPED | OUTPATIENT
Start: 2021-03-05 | End: 2021-04-02 | Stop reason: SDUPTHER

## 2021-03-05 NOTE — TELEPHONE ENCOUNTER
Last Appt:  2/19/2020 (6mo)  Next Appt:   6/22/2021 (new to est with KB)    Unable to access [place]  Med contract not available    Med verified in Epic

## 2021-04-02 DIAGNOSIS — F41.9 ANXIETY: ICD-10-CM

## 2021-04-02 DIAGNOSIS — E78.00 PURE HYPERCHOLESTEROLEMIA: ICD-10-CM

## 2021-04-02 DIAGNOSIS — F43.9 STRESS: ICD-10-CM

## 2021-04-02 RX ORDER — ALPRAZOLAM 0.5 MG/1
TABLET ORAL
Qty: 30 TABLET | Refills: 0 | Status: SHIPPED | OUTPATIENT
Start: 2021-04-02 | End: 2021-04-05

## 2021-04-02 RX ORDER — EZETIMIBE 10 MG/1
TABLET ORAL
Qty: 90 TABLET | Refills: 0 | Status: SHIPPED | OUTPATIENT
Start: 2021-04-02 | End: 2021-06-22 | Stop reason: SDUPTHER

## 2021-04-02 NOTE — TELEPHONE ENCOUNTER
Last Appt:  2/19/2020 (physical)  Next Appt:   6/22/2021 (new to KB)    Unable to access [place] at this time  Last Fill: 3/5/2021     Med verified in [facility]

## 2021-04-05 DIAGNOSIS — F41.9 ANXIETY: ICD-10-CM

## 2021-04-05 DIAGNOSIS — F43.9 STRESS: ICD-10-CM

## 2021-04-05 RX ORDER — ALPRAZOLAM 0.5 MG/1
0.5 TABLET ORAL NIGHTLY PRN
Qty: 30 TABLET | Refills: 0 | Status: SHIPPED | OUTPATIENT
Start: 2021-04-05 | End: 2021-06-08

## 2021-04-06 NOTE — TELEPHONE ENCOUNTER
Controlled Substance Monitoring:    Acute and Chronic Pain Monitoring:   RX Monitoring 4/5/2021   Attestation -   Periodic Controlled Substance Monitoring No signs of potential drug abuse or diversion identified.

## 2021-06-05 DIAGNOSIS — F41.9 ANXIETY: ICD-10-CM

## 2021-06-08 RX ORDER — ALPRAZOLAM 0.5 MG/1
TABLET ORAL
Qty: 30 TABLET | Refills: 0 | Status: SHIPPED | OUTPATIENT
Start: 2021-06-08 | End: 2021-07-06

## 2021-06-08 NOTE — TELEPHONE ENCOUNTER
Pt calling back to question why this wasn't filled yet, pt was out yesterday and now requesting refill to go to [facility] and not [facility].

## 2021-06-08 NOTE — TELEPHONE ENCOUNTER
Controlled Substance Monitoring:    Acute and Chronic Pain Monitoring:   RX Monitoring 6/8/2021   Attestation -   Periodic Controlled Substance Monitoring No signs of potential drug abuse or diversion identified.

## 2021-06-16 RX ORDER — LOSARTAN POTASSIUM 25 MG/1
TABLET ORAL
Qty: 90 TABLET | Refills: 1 | OUTPATIENT
Start: 2021-06-16

## 2021-06-18 RX ORDER — LOSARTAN POTASSIUM 25 MG/1
25 TABLET ORAL DAILY
Qty: 30 TABLET | Refills: 0 | Status: SHIPPED | OUTPATIENT
Start: 2021-06-18 | End: 2021-06-23

## 2021-06-18 NOTE — TELEPHONE ENCOUNTER
Pt is now schedule for 6/23/21. Please send month of losartan to [facility] if possible. Pt will run out over weekend.     [phone]    Last Appt:  12/16/2020  Next Appt:   6/23/2021  Med verified in Epic

## 2021-06-22 ENCOUNTER — OFFICE VISIT (OUTPATIENT)
Dept: FAMILY MEDICINE CLINIC | Age: 61
End: 2021-06-22
Payer: COMMERCIAL

## 2021-06-22 VITALS
SYSTOLIC BLOOD PRESSURE: 138 MMHG | DIASTOLIC BLOOD PRESSURE: 72 MMHG | TEMPERATURE: 99.1 F | OXYGEN SATURATION: 98 % | BODY MASS INDEX: 22.98 KG/M2 | HEART RATE: 79 BPM | WEIGHT: 134.6 LBS | HEIGHT: 64 IN | RESPIRATION RATE: 16 BRPM

## 2021-06-22 DIAGNOSIS — E78.00 PURE HYPERCHOLESTEROLEMIA: ICD-10-CM

## 2021-06-22 DIAGNOSIS — C91.10 CHRONIC LYMPHOCYTIC LEUKEMIA (HCC): ICD-10-CM

## 2021-06-22 DIAGNOSIS — R53.83 FATIGUE, UNSPECIFIED TYPE: Primary | ICD-10-CM

## 2021-06-22 DIAGNOSIS — R59.0 AXILLARY ADENOPATHY: ICD-10-CM

## 2021-06-22 DIAGNOSIS — F41.9 ANXIETY: ICD-10-CM

## 2021-06-22 PROCEDURE — 99214 OFFICE O/P EST MOD 30 MIN: CPT | Performed by: FAMILY MEDICINE

## 2021-06-22 PROCEDURE — 3017F COLORECTAL CA SCREEN DOC REV: CPT | Performed by: FAMILY MEDICINE

## 2021-06-22 PROCEDURE — G8420 CALC BMI NORM PARAMETERS: HCPCS | Performed by: FAMILY MEDICINE

## 2021-06-22 PROCEDURE — 1036F TOBACCO NON-USER: CPT | Performed by: FAMILY MEDICINE

## 2021-06-22 PROCEDURE — G8427 DOCREV CUR MEDS BY ELIG CLIN: HCPCS | Performed by: FAMILY MEDICINE

## 2021-06-22 RX ORDER — EZETIMIBE 10 MG/1
TABLET ORAL
Qty: 90 TABLET | Refills: 3 | Status: SHIPPED | OUTPATIENT
Start: 2021-06-22 | End: 2022-03-02 | Stop reason: SDUPTHER

## 2021-06-22 ASSESSMENT — ENCOUNTER SYMPTOMS
BLOOD IN STOOL: 0
WHEEZING: 0
SINUS PRESSURE: 1
SHORTNESS OF BREATH: 0

## 2021-06-22 NOTE — PROGRESS NOTES
TANISHA Mena [address]  [address], [address] [place]  [phone]      Harmeet Berry is a 64 y.o. female who presents today for her medical conditions/complaints as noted below. Harmeet Berry is c/o of Established New Doctor (former pt of [facility] ) and Otalgia (right side, with sinus pressure)      HPI:     Pt here today to establish - was most recently seeing [facility]. [facility] Yen[facility] for [facility]LL - next appt in 8/2021. Was taking Imbruvica 140 mg BID for this; however, at her last visit with them, her BP was elevated, so her dose was decreased to once daily. Since decreasing her dose, she feels slightly enlarged lymph nodes in b/l axillary regions; none in cervical area. Just feels \"thickened\" under each arm. Sent a message to her Heme-Onc, and he suggested that she have bloodwork while she was here for today's appt and have it sent to him for review; next appt with him in 8/2021. She then saw Cardiology via virtual appt - was started on Losartan 25 mg daily. BP is still variable - today, it is well-controlled at 138/72. Checks BP at home - usually 118-120/60's in the mornings; can go up to 140-160/80-90's in the afternoon (checks after work). Will then try to do some yoga or light exercise, and when she re-checks, BP will go back down. Will see Cardiology tomorrow for follow-up. Was recommended to take ASA 81 mg daily by Cardio due to extensive family h/o CAD/MI's. Also sees [facility]ologist at [facility] - just saw them last month. Not due for Pap for a couple more years. Last mammogram was on 9/29/20 - negative; has had aspiration of multiple benign lesions from breasts in the past.    Taking Zetia 10 mg daily for HLD - stable. Also takes Omega-3 fatty acid capsule nightly.     Taking Xanax 0.25 mg (1/2 of a 0.5 mg tab) BID as needed for anxiety - takes this most days, as she will feel worse if she has missed a couple doses. Taking OTC Vit D3 2000 IU daily - last Vit D level was normal at 80 in 5/2020. Also taking Calcium once daily and MVI daily. Using Nasonex nasal spray as needed for allergic rhinitis - stable. Will also take OTC sinus tab occasionally as needed. Past Medical History:   Diagnosis Date    Allergic rhinitis     Anxiety     Chronic sinusitis     CLL (chronic lymphocytic leukemia) ([facility] [address]     (follows with Dr. Tanvir Hutson, Oncology Dept., [address] [facility]).  Depression     Hemorrhoids     (internal and external)    Hyperlipidemia     Kidney stones     history of     Non Hodgkin's lymphoma ([facility] [address]     1998 remission for 10 years currently under [facility]  care    Proctitis 2008      Past Surgical History:   Procedure Laterality Date    COLONOSCOPY  02/12/2008    (Dr. Zuhair Blackmon) - External and internal hemorrhoids, nonspecific proctitis. (Path: Reactive change, mild).     COLONOSCOPY  03/30/2015    internal hemorrhoids [facility] Dr. Margaret Arauz Right 2018    Dr. Selvin Caballero - done at [address] [facility]       Family History   Problem Relation Age of Onset    Heart Disease Mother     Coronary Art Dis Sister         (CABG)    Heart Disease Sister     High Blood Pressure Sister     Hypertension Sister     Hypertension Sister     Heart Attack Brother     Coronary Art Dis Brother         (with stent placement)    Coronary Art Dis Brother         (with stent placement)    Heart Attack Brother     Heart Attack Brother     Hypertension Brother     Hypertension Brother     Heart Disease Maternal Grandmother     Heart Disease Maternal Grandfather     Early Death Paternal Grandfather         electrocution    High Cholesterol Daughter      Social History     Tobacco Use    Smoking status: Never Smoker    Smokeless tobacco: Never Used   Substance Use Topics    Alcohol use: Yes     Comment: weekly, champagne, beer      Current Outpatient Medications   Medication Sig Dispense Refill    ezetimibe (ZETIA) 10 MG tablet TAKE ONE TABLET BY MOUTH DAILY 90 tablet 3    ALPRAZolam (XANAX) 0.5 MG tablet TAKE ONE TABLET BY MOUTH EVERY NIGHT AT BEDTIME AS NEEDED FOR SLEEP OR ANXIETY 30 tablet 0    mometasone (NASONEX) 50 MCG/ACT nasal spray instill 2 sprays into each nostril once daily 51 g 5    IMBRUVICA 140 MG chemo capsule Take 1 capsule by mouth daily       Multiple Vitamins-Minerals (MULTIVITAL PO) Take  by mouth.  calcium carbonate (OSCAL) 500 MG TABS tablet Take 500 mg by mouth daily.  Vitamin D (CHOLECALCIFEROL) 1000 UNITS CAPS capsule Take 2,000 Units by mouth daily       omega-3 acid ethyl esters (LOVAZA) 1 G capsule One capsule bid for cholesterol 60 capsule 5    aspirin 81 MG tablet Take 81 mg by mouth daily.  losartan (COZAAR) 25 MG tablet Take 1 tablet by mouth 2 times daily 60 tablet 12     No current facility-administered medications for this visit. Allergies   Allergen Reactions    Latex Rash    Codeine Nausea Only and Nausea And Vomiting       Health Maintenance   Topic Date Due    Cervical cancer screen  05/12/2018    Pneumococcal 0-64 years Vaccine (2 of 4 - PPSV23) 04/15/2020    Flu vaccine (1) 09/01/2021    Potassium monitoring  06/23/2022    Creatinine monitoring  06/23/2022    Breast cancer screen  09/28/2022    Colon cancer screen colonoscopy  03/30/2025    Lipid screen  06/23/2026    DTaP/Tdap/Td vaccine (2 - Td or Tdap) 02/15/2028    Shingles Vaccine  Completed    COVID-19 Vaccine  Completed    Hepatitis C screen  Addressed    HIV screen  Addressed    Hepatitis A vaccine  Aged Out    Hepatitis B vaccine  Aged Out    Hib vaccine  Aged Out    Meningococcal (ACWY) vaccine  Aged Out       Subjective:      Review of Systems   Constitutional: Positive for fatigue (x past 1.5 months).    HENT: Positive for ear pain (R ear - 2-3 weeks has felt worse; did get some wax out today), hearing loss (intermittently muffled) and sinus pressure (R-sided, mild). Negative for ear discharge. Respiratory: Negative for shortness of breath and wheezing. Cardiovascular: Negative for chest pain and palpitations. Gastrointestinal: Negative for blood in stool. Genitourinary: Negative for dysuria and hematuria. Skin: Negative for rash and wound. Neurological: Negative for dizziness and light-headedness. Psychiatric/Behavioral: Positive for sleep disturbance (intermittent, maybe one night per week, she will have trouble falling or staying asleep). Negative for dysphoric mood and suicidal ideas. Objective:     Vitals:    06/22/21 1422   BP: 138/72   Site: Right Upper Arm   Position: Sitting   Pulse: 79   Resp: 16   Temp: 99.1 °F (37.3 °C)   TempSrc: Tympanic   SpO2: 98%   Weight: 134 lb 9.6 oz (61.1 kg)   Height: 5' 4\" (1.626 m)     Physical Exam  Vitals and nursing note reviewed. Constitutional:       General: She is not in acute distress. Appearance: She is well-developed. HENT:      Head: Normocephalic and atraumatic. Right Ear: Tympanic membrane, ear canal and external ear normal.      Left Ear: Tympanic membrane, ear canal and external ear normal.      Nose: Nose normal.      Mouth/Throat:      Mouth: Mucous membranes are moist.      Pharynx: Oropharynx is clear. No posterior oropharyngeal erythema. Eyes:      Conjunctiva/sclera: Conjunctivae normal.   Cardiovascular:      Rate and Rhythm: Normal rate and regular rhythm. Heart sounds: Normal heart sounds. Pulmonary:      Effort: Pulmonary effort is normal. No respiratory distress. Breath sounds: Normal breath sounds. Abdominal:      General: Bowel sounds are normal. There is no distension. Palpations: Abdomen is soft. Tenderness: There is no abdominal tenderness. Musculoskeletal:      Cervical back: Neck supple.    Lymphadenopathy:      Upper Body:      Right upper body: No axillary adenopathy. Left upper body: No axillary adenopathy. Comments: Pt states she has noticed \"fullness\" in b/l axillary regions, but this was not appreciated on exam; no discrete adenopathy. Skin:     General: Skin is warm and dry. Neurological:      Mental Status: She is alert and oriented to person, place, and time. Assessment:       Diagnosis Orders   1. Fatigue, unspecified type  TSH With Reflex Ft4   2. Pure hypercholesterolemia  Lipid Panel    ezetimibe (ZETIA) 10 MG tablet   3. Chronic lymphocytic leukemia (HCC)  CBC With Auto Differential    Comprehensive Metabolic Panel    Lactate Dehydrogenase   4. Anxiety     5. Axillary adenopathy           Plan:      Return in about 6 months (around 12/22/2021) for f/u HTN, anxiety. Orders Placed This Encounter   Procedures    CBC With Auto Differential     Standing Status:   Future     Number of Occurrences:   1     Standing Expiration Date:   6/22/2022    Comprehensive Metabolic Panel     Standing Status:   Future     Number of Occurrences:   1     Standing Expiration Date:   6/22/2022    Lactate Dehydrogenase     Standing Status:   Future     Number of Occurrences:   1     Standing Expiration Date:   6/22/2022    TSH With Reflex Ft4     Standing Status:   Future     Number of Occurrences:   1     Standing Expiration Date:   6/22/2022    Lipid Panel     Standing Status:   Future     Number of Occurrences:   1     Standing Expiration Date:   6/22/2022     Order Specific Question:   Is Patient Fasting?/# of Hours     Answer:   12     Orders Placed This Encounter   Medications    ezetimibe (ZETIA) 10 MG tablet     Sig: TAKE ONE TABLET BY MOUTH DAILY     Dispense:  90 tablet     Refill:  3       Patient given educational materials - see patient instructions. Discussed use, benefit, and side effects of prescribed medications. All patient questions answered. Pt voiced understanding. Reviewed health maintenance.             Electronically signed by Mery Tim DO, DO on 7/4/2021 at 11:35 PM

## 2021-06-22 NOTE — PATIENT INSTRUCTIONS
Patient Education        Fatigue: Care Instructions  Your Care Instructions     Fatigue is a feeling of tiredness, exhaustion, or lack of energy. You may feel fatigue because of too much or not enough activity. It can also come from stress, lack of sleep, boredom, and poor diet. Many medical problems, such as viral infections, can cause fatigue. Emotional problems, especially depression, are often the cause of fatigue. Fatigue is most often a symptom of another problem. Treatment for fatigue depends on the cause. For example, if you have fatigue because you have a certain health problem, treating this problem also treats your fatigue. If depression or anxiety is the cause, treatment may help. Follow-up care is a key part of your treatment and safety. Be sure to make and go to all appointments, and call your doctor if you are having problems. It's also a good idea to know your test results and keep a list of the medicines you take. How can you care for yourself at home? · Get regular exercise. But don't overdo it. Go back and forth between rest and exercise. · Get plenty of rest.  · Eat a healthy diet. Do not skip meals, especially breakfast.  · Reduce your use of caffeine, tobacco, and alcohol. Caffeine is most often found in coffee, tea, cola drinks, and chocolate. · Limit medicines that can cause fatigue. This includes tranquilizers and cold and allergy medicines. When should you call for help? Watch closely for changes in your health, and be sure to contact your doctor if:    · You have new symptoms such as fever or a rash.     · Your fatigue gets worse.     · You have been feeling down, depressed, or hopeless. Or you may have lost interest in things that you usually enjoy.     · You are not getting better as expected. Where can you learn more? Go to [email or web address]://[email or web address].[email or web address] org and sign in to your [employer] account.  Enter C848 in the Imaging3 box to learn more about \"Fatigue: Care Instructions. \"     If you do not have an account, please click on the \"Sign Up Now\" link. Current as of: October 19, 2020               Content Version: 12.9  © 2006-2021 [employer]. Care instructions adapted under license by [facility] ([facility]). If you have questions about a medical condition or this instruction, always ask your healthcare professional. [address] any warranty or liability for your use of this information. Patient Education        Learning About High Cholesterol  What is high cholesterol? High cholesterol means that you have too much cholesterol in your blood. Cholesterol is a type of fat. It's needed for many body functions, such as making new cells. Cholesterol is made by your body. It also comes from food you eat. Having high cholesterol can lead to the buildup of plaque in artery walls. This can increase your risk of heart attack and stroke. When your doctor talks about high cholesterol levels, your doctor is talking about your total cholesterol and LDL cholesterol (the \"bad\" cholesterol) levels. Your doctor may also speak about HDL (the \"good\" cholesterol) levels. High HDL is linked with a lower risk for coronary artery disease, heart attack, and stroke. Your cholesterol levels help your doctor find out your risk for having a heart attack or stroke. How can you prevent high cholesterol? A heart-healthy lifestyle can help you prevent high cholesterol. This lifestyle helps lower your risk for a heart attack and stroke. · Eat heart-healthy foods. ? Eat fruits, vegetables, whole grains, beans, and other high-fiber foods. ? Eat lean proteins, such as seafood, lean meats, beans, nuts, and soy products. ? Eat healthy fats, such as canola and olive oil. ? Choose foods that are low in saturated fat. ? Limit sodium and alcohol. ? Limit drinks and foods with added sugar. · Be active.  Try to do moderate activity at least 2½ hours a week. Or try to do vigorous activity at least 1¼ hours a week. You may want to walk or try other activities, such as running, swimming, cycling, or playing tennis or team sports. · Stay at a healthy weight. Lose weight if you need to. · Don't smoke. If you need help quitting, talk to your doctor about stop-smoking programs and medicines. These can increase your chances of quitting for good. How is high cholesterol treated? The goal of treatment is to reduce your chances of having a heart attack or stroke. The goal is not to lower your cholesterol numbers only. · Have a heart-healthy lifestyle. This includes eating healthy foods, not smoking, losing weight, and being more active. · You may choose to take medicine. Follow-up care is a key part of your treatment and safety. Be sure to make and go to all appointments, and call your doctor if you are having problems. It's also a good idea to know your test results and keep a list of the medicines you take. Where can you learn more? Go to https://Nabsys.[employer]. org and sign in to your [employer] account. Enter U116 in the InsideView box to learn more about \"Learning About High Cholesterol. \"     If you do not have an account, please click on the \"Sign Up Now\" link. Current as of: August 31, 2020               Content Version: 12.9  © 4959-0949 Healthwise, Incorporated. Care instructions adapted under license by [facility] ([facility]). If you have questions about a medical condition or this instruction, always ask your healthcare professional. [place] [address] any warranty or liability for your use of this information.

## 2021-06-23 ENCOUNTER — OFFICE VISIT (OUTPATIENT)
Dept: CARDIOLOGY | Age: 61
End: 2021-06-23
Payer: COMMERCIAL

## 2021-06-23 ENCOUNTER — HOSPITAL ENCOUNTER (OUTPATIENT)
Dept: LAB | Age: 61
Discharge: HOME | End: 2021-06-23
Payer: COMMERCIAL

## 2021-06-23 VITALS
HEIGHT: 64 IN | SYSTOLIC BLOOD PRESSURE: 124 MMHG | BODY MASS INDEX: 22.88 KG/M2 | HEART RATE: 61 BPM | WEIGHT: 134 LBS | DIASTOLIC BLOOD PRESSURE: 56 MMHG

## 2021-06-23 DIAGNOSIS — C91.10 CHRONIC LYMPHOCYTIC LEUKEMIA (HCC): ICD-10-CM

## 2021-06-23 DIAGNOSIS — R53.83 FATIGUE, UNSPECIFIED TYPE: ICD-10-CM

## 2021-06-23 DIAGNOSIS — E78.5 HYPERLIPIDEMIA, UNSPECIFIED HYPERLIPIDEMIA TYPE: ICD-10-CM

## 2021-06-23 DIAGNOSIS — I10 ESSENTIAL HYPERTENSION: Primary | ICD-10-CM

## 2021-06-23 DIAGNOSIS — E78.00 PURE HYPERCHOLESTEROLEMIA: ICD-10-CM

## 2021-06-23 LAB
ABSOLUTE EOS #: 0.04 K/UL (ref 0–0.44)
ABSOLUTE IMMATURE GRANULOCYTE: <0.03 K/UL (ref 0–0.3)
ABSOLUTE LYMPH #: 1.52 K/UL (ref 1.1–3.7)
ABSOLUTE MONO #: 0.5 K/UL (ref 0.1–1.2)
ALBUMIN SERPL-MCNC: 4.1 G/DL (ref 3.5–5.2)
ALBUMIN/GLOBULIN RATIO: 1.9 (ref 1–2.5)
ALP BLD-CCNC: 79 U/L (ref 35–104)
ALT SERPL-CCNC: 30 U/L (ref 5–33)
ANION GAP SERPL CALCULATED.3IONS-SCNC: 8 MMOL/L (ref 9–17)
AST SERPL-CCNC: 21 U/L
BASOPHILS # BLD: 0 % (ref 0–2)
BASOPHILS ABSOLUTE: <0.03 K/UL (ref 0–0.2)
BILIRUB SERPL-MCNC: 0.48 MG/DL (ref 0.3–1.2)
BUN BLDV-MCNC: 14 MG/DL (ref 8–23)
BUN/CREAT BLD: 24 (ref 9–20)
CALCIUM SERPL-MCNC: 9.5 MG/DL (ref 8.6–10.4)
CHLORIDE BLD-SCNC: 105 MMOL/L (ref 98–107)
CHOLESTEROL/HDL RATIO: 2.6
CHOLESTEROL: 193 MG/DL
CO2: 28 MMOL/L (ref 20–31)
CREAT SERPL-MCNC: 0.58 MG/DL (ref 0.5–0.9)
DIFFERENTIAL TYPE: NORMAL
EOSINOPHILS RELATIVE PERCENT: 1 % (ref 1–4)
GFR AFRICAN AMERICAN: >60 ML/MIN
GFR NON-AFRICAN AMERICAN: >60 ML/MIN
GFR SERPL CREATININE-BSD FRML MDRD: ABNORMAL ML/MIN/{1.73_M2}
GFR SERPL CREATININE-BSD FRML MDRD: ABNORMAL ML/MIN/{1.73_M2}
GLUCOSE BLD-MCNC: 91 MG/DL (ref 70–99)
HCT VFR BLD CALC: 39.6 % (ref 36.3–47.1)
HDLC SERPL-MCNC: 74 MG/DL
HEMOGLOBIN: 12.7 G/DL (ref 11.9–15.1)
IMMATURE GRANULOCYTES: 0 %
LACTATE DEHYDROGENASE: 163 U/L (ref 135–214)
LDL CHOLESTEROL: 108 MG/DL (ref 0–130)
LYMPHOCYTES # BLD: 32 % (ref 24–43)
MCH RBC QN AUTO: 29.5 PG (ref 25.2–33.5)
MCHC RBC AUTO-ENTMCNC: 32.1 G/DL (ref 25.2–33.5)
MCV RBC AUTO: 92.1 FL (ref 82.6–102.9)
MONOCYTES # BLD: 11 % (ref 3–12)
NRBC AUTOMATED: 0 PER 100 WBC
PDW BLD-RTO: 13.9 % (ref 11.8–14.4)
PLATELET # BLD: 214 K/UL (ref 138–453)
PLATELET ESTIMATE: NORMAL
PMV BLD AUTO: 11.5 FL (ref 8.1–13.5)
POTASSIUM SERPL-SCNC: 4.3 MMOL/L (ref 3.7–5.3)
RBC # BLD: 4.3 M/UL (ref 3.95–5.11)
RBC # BLD: NORMAL 10*6/UL
SEG NEUTROPHILS: 56 % (ref 36–65)
SEGMENTED NEUTROPHILS ABSOLUTE COUNT: 2.69 K/UL (ref 1.5–8.1)
SODIUM BLD-SCNC: 141 MMOL/L (ref 135–144)
TOTAL PROTEIN: 6.3 G/DL (ref 6.4–8.3)
TRIGL SERPL-MCNC: 57 MG/DL
TSH SERPL DL<=0.05 MIU/L-ACNC: 1.93 MIU/L (ref 0.3–5)
VLDLC SERPL CALC-MCNC: NORMAL MG/DL (ref 1–30)
WBC # BLD: 4.8 K/UL (ref 3.5–11.3)
WBC # BLD: NORMAL 10*3/UL

## 2021-06-23 PROCEDURE — 36415 COLL VENOUS BLD VENIPUNCTURE: CPT

## 2021-06-23 PROCEDURE — G8427 DOCREV CUR MEDS BY ELIG CLIN: HCPCS | Performed by: INTERNAL MEDICINE

## 2021-06-23 PROCEDURE — 80061 LIPID PANEL: CPT

## 2021-06-23 PROCEDURE — 93000 ELECTROCARDIOGRAM COMPLETE: CPT | Performed by: INTERNAL MEDICINE

## 2021-06-23 PROCEDURE — 80053 COMPREHEN METABOLIC PANEL: CPT

## 2021-06-23 PROCEDURE — G8420 CALC BMI NORM PARAMETERS: HCPCS | Performed by: INTERNAL MEDICINE

## 2021-06-23 PROCEDURE — 85025 COMPLETE CBC W/AUTO DIFF WBC: CPT

## 2021-06-23 PROCEDURE — 84443 ASSAY THYROID STIM HORMONE: CPT

## 2021-06-23 PROCEDURE — 3017F COLORECTAL CA SCREEN DOC REV: CPT | Performed by: INTERNAL MEDICINE

## 2021-06-23 PROCEDURE — 99214 OFFICE O/P EST MOD 30 MIN: CPT | Performed by: INTERNAL MEDICINE

## 2021-06-23 PROCEDURE — 83615 LACTATE (LD) (LDH) ENZYME: CPT

## 2021-06-23 PROCEDURE — 1036F TOBACCO NON-USER: CPT | Performed by: INTERNAL MEDICINE

## 2021-06-23 RX ORDER — LOSARTAN POTASSIUM 25 MG/1
25 TABLET ORAL 2 TIMES DAILY
Qty: 60 TABLET | Refills: 12 | Status: SHIPPED | OUTPATIENT
Start: 2021-06-23 | End: 2022-06-29 | Stop reason: SDUPTHER

## 2021-06-23 NOTE — PROGRESS NOTES
Today's Date: 6/23/2021  Patient's Name: Sonam You  Patient's age: 64 y.o., 1960    Subjective: Sonam You is being seen in clinic today regarding   Chief Complaint   Patient presents with    Hyperlipidemia    Hypertension         She is doing well from a cardiac standpoint. No chest pain, no dyspnea, no PND, no syncope or pre-syncope, no orthopnea. BP at home shows evening BP reading to be high. She takes losartan at night time. Past Medical History:   has a past medical history of Allergic rhinitis, Anxiety, Chronic sinusitis, CLL (chronic lymphocytic leukemia) ([facility] [address] Depression, Hemorrhoids, Hyperlipidemia, Kidney stones, Non Hodgkin's lymphoma ([facility] [address] and Proctitis. Past Surgical History:   has a past surgical history that includes Tubal ligation; Colonoscopy (02/12/2008); lymph node biopsy (1998); Colonoscopy (03/30/2015); and Knee arthroscopy (Right, 2018). Home Medications:  Prior to Admission medications    Medication Sig Start Date End Date Taking? Authorizing Provider   ezetimibe (ZETIA) 10 MG tablet TAKE ONE TABLET BY MOUTH DAILY 6/22/21  Yes Frederick Shaffer DO   losartan (COZAAR) 25 MG tablet Take 1 tablet by mouth daily 6/18/21  Yes Duglas Garcia MD   ALPRAZolam [facility]) 0.5 MG tablet TAKE ONE TABLET BY MOUTH EVERY NIGHT AT BEDTIME AS NEEDED FOR SLEEP OR ANXIETY 6/8/21 7/8/21 Yes Frederick Shaffer DO   mometasone (NASONEX) 50 MCG/ACT nasal spray instill 2 sprays into each nostril once daily 1/9/20  Yes Felix Dandy [place], [place]   IMBRUVICA 140 MG chemo capsule Take 1 capsule by mouth daily  5/8/17  Yes Historical Provider, MD   Multiple Vitamins-Minerals (MULTIVITAL PO) Take  by mouth. Yes Historical Provider, MD   calcium carbonate (OSCAL) 500 MG TABS tablet Take 500 mg by mouth daily.    Yes Historical Provider, MD   Vitamin D (CHOLECALCIFEROL) 1000 UNITS CAPS capsule Take 2,000 Units by mouth daily    Yes Historical Provider, MD   omega-3 acid ethyl esters (LOVAZA) 1 G capsule One capsule bid for cholesterol 12/23/13  Yes Chelsy  [place], [address]   aspirin 81 MG tablet Take 81 mg by mouth daily. Yes Historical Provider, MD       Allergies:  Latex and Codeine    Social History:   reports that she has never smoked. She has never used smokeless tobacco. She reports current alcohol use. She reports that she does not use drugs. Family History: family history includes Coronary Art Dis in her brother, brother, and sister; Early Death in her paternal grandfather; Heart Attack in her brother, brother, and brother; Heart Disease in her maternal grandfather, maternal grandmother, mother, and sister; High Blood Pressure in her sister; High Cholesterol in her daughter; Hypertension in her brother, brother, sister, and sister. No h/o sudden cardiac death. No for premature CAD    REVIEW OF SYSTEMS:    · Constitutional: there has been no unanticipated weight loss. There's been No change in energy level, No change in activity level. · Eyes: No visual changes or diplopia. No scleral icterus. · ENT: No Headaches, hearing loss or vertigo. No mouth sores or sore throat. · Cardiovascular: see above  · Respiratory: see above  · Gastrointestinal: No abdominal pain, appetite loss, blood in stools. · Genitourinary: No dysuria, trouble voiding, or hematuria. · Musculoskeletal:  No gait disturbance, No weakness or joint complaints. · Integumentary: No rash or pruritis. · Neurological: No headache or diplopia. No tingling  · Psychiatric: No anxiety, or depression. · Endocrine: No temperature intolerance. · Hematologic/Lymphatic: No abnormal bruising or bleeding, blood clots or swollen lymph nodes. · Allergic/Immunologic: No nasal congestion or hives.       PHYSICAL EXAM:      BP (!) 124/56   Pulse 61   Ht 5' 4\" (1.626 m)   Wt 134 lb (60.8 kg)   BMI 23.00 kg/m²    Constitutional and General Appearance: alert, cooperative, no distress and appears stated age  [de-identified]: PERRL, no cervical lymphadenopathy. No masses palpable. Normal oral mucosa  Respiratory:  · Normal excursion and expansion without use of accessory muscles  · Resp Auscultation: Good respiratory effort. No for increased work of breathing. On auscultation: clear to auscultation bilaterally  Cardiovascular:  · Heart tones are crisp and normal. regular S1 and S2.  · Jugular venous pulsation Normal  · The carotid upstroke is normal in amplitude and contour without delay or bruit   Abdomen:   · soft  · Bowel sounds present  Extremities:  ·  No edema  Neurological:  · Alert and oriented. Cardiac Data:  EKG: NSR, NL ECG    Labs:     CBC: No results for input(s): WBC, HGB, HCT, PLT in the last 72 hours. BMP: No results for input(s): NA, K, CO2, BUN, CREATININE, LABGLOM, GLUCOSE in the last 72 hours. PT/INR: No results for input(s): PROTIME, INR in the last 72 hours. FASTING LIPID PANEL:  Lab Results   Component Value Date    HDL 75 05/21/2020    TRIG 48 05/21/2020     LIVER PROFILE:No results for input(s): AST, ALT, LABALBU in the last 72 hours. Problem List:  Patient Active Problem List   Diagnosis    [facility] lymphocytic [facility] ([facility] [address]    Anxiety    Non Hodgkin's lymphoma ([facility] [address]    Kidney stones    Allergic rhinitis    Chronic sinusitis    Other hyperlipidemia        ASSESSMENT/PLAN:  - Hypertension. Currently losartan 25mg po QPM. I will add 25mg in am. Patient to monitor BP at home. BP goal <130/80. HTN likely related to [address] therapy. Counseled regarding low-salt diet.     -TTE 11/28/14 showed LVEF 68%, mild MR/TR, grade II DD. Treadmill stress test 4/14/15: 13.4 METS and no ischemic changes.     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 On PO chemotherapy- Imbruvica     -Hyperlipidemia. - 5/21/20. Stable.  Continue zetia     -Continue diet and exercise.     -RTC 12 months    Tavares Tello MD  [facility]  [phone]

## 2021-07-05 DIAGNOSIS — F41.9 ANXIETY: ICD-10-CM

## 2021-07-06 NOTE — TELEPHONE ENCOUNTER
Maren Jean Baptiste called requesting a refill of the below medication which has been pended for you:     Requested Prescriptions     Pending Prescriptions Disp Refills    ALPRAZolam (XANAX) 0.5 MG tablet [Pharmacy Med Name: ALPRAZOLAM 0.5 MG TABLET] 30 tablet      Sig: take 1 tablet by mouth at bedtime if needed       Last Appointment Date: 6/22/2021  Next Appointment Date: 12/22/2021    Allergies   Allergen Reactions    Latex Rash    Codeine Nausea Only and Nausea And Vomiting

## 2021-07-07 RX ORDER — ALPRAZOLAM 0.5 MG/1
TABLET ORAL
Qty: 30 TABLET | Refills: 0 | Status: SHIPPED | OUTPATIENT
Start: 2021-07-08 | End: 2021-08-03

## 2021-08-02 DIAGNOSIS — F41.9 ANXIETY: ICD-10-CM

## 2021-08-05 RX ORDER — ALPRAZOLAM 0.5 MG/1
0.5 TABLET ORAL NIGHTLY PRN
Qty: 30 TABLET | Refills: 0 | Status: SHIPPED | OUTPATIENT
Start: 2021-08-07 | End: 2021-09-09 | Stop reason: SDUPTHER

## 2021-08-05 NOTE — TELEPHONE ENCOUNTER
Controlled Substance Monitoring:    Acute and Chronic Pain Monitoring:   RX Monitoring 8/5/2021   Attestation -   Periodic Controlled Substance Monitoring No signs of potential drug abuse or diversion identified.

## 2021-09-08 DIAGNOSIS — F41.9 ANXIETY: ICD-10-CM

## 2021-09-08 NOTE — TELEPHONE ENCOUNTER
Per [facility], last fill 8/7, 30 tablets for 30 days. Jeanmarie Chang called requesting a refill of the below medication which has been pended for you:     Requested Prescriptions     Pending Prescriptions Disp Refills    ALPRAZolam (XANAX) 0.5 MG tablet 30 tablet 0     Sig: Take 1 tablet by mouth nightly as needed for Sleep or Anxiety for up to 30 days.        Last Appointment Date: 6/22/2021  Next Appointment Date: 12/22/2021    Allergies   Allergen Reactions    Latex Rash    Codeine Nausea Only and Nausea And Vomiting

## 2021-09-09 RX ORDER — ALPRAZOLAM 0.5 MG/1
0.5 TABLET ORAL NIGHTLY PRN
Qty: 30 TABLET | Refills: 1 | Status: SHIPPED | OUTPATIENT
Start: 2021-09-09 | End: 2021-11-24

## 2021-09-09 NOTE — TELEPHONE ENCOUNTER
Controlled Substance Monitoring:    Acute and Chronic Pain Monitoring:   RX Monitoring 9/9/2021   Attestation -   Periodic Controlled Substance Monitoring No signs of potential drug abuse or diversion identified.

## 2021-11-09 ENCOUNTER — TELEPHONE (OUTPATIENT)
Dept: FAMILY MEDICINE CLINIC | Age: 61
End: 2021-11-09

## 2021-11-10 NOTE — TELEPHONE ENCOUNTER
Spoke with patient. Informed patient that she was not due for one until the year of 2025. Patient verbalized understanding and stated she just wanted to make sure she was due for anything.

## 2021-11-23 DIAGNOSIS — F41.9 ANXIETY: ICD-10-CM

## 2021-11-24 RX ORDER — ALPRAZOLAM 0.5 MG/1
TABLET ORAL
Qty: 30 TABLET | Refills: 1 | Status: SHIPPED | OUTPATIENT
Start: 2021-11-24 | End: 2021-12-24

## 2021-11-24 NOTE — TELEPHONE ENCOUNTER
Controlled Substance Monitoring:    Acute and Chronic Pain Monitoring:   RX Monitoring 11/24/2021   Attestation -   Periodic Controlled Substance Monitoring No signs of potential drug abuse or diversion identified. Obtained or confirmed \"Medication Contract\" on file.

## 2021-11-24 NOTE — TELEPHONE ENCOUNTER
Per [facility], last fill 10/18/21, quant 27 for [address] called requesting a refill of the below medication which has been pended for you:     Requested Prescriptions     Pending Prescriptions Disp Refills    ALPRAZolam (XANAX) 0.5 MG tablet [Pharmacy Med Name: ALPRAZOLAM 0.5 MG TABLET] 30 tablet      Sig: take 1 tablet by mouth nightly if needed for sleep or anxiety       Last Appointment Date: 6/22/2021  Next Appointment Date: 1/12/2022    Allergies   Allergen Reactions    Latex Rash    Codeine Nausea Only and Nausea And Vomiting

## 2022-01-12 ENCOUNTER — OFFICE VISIT (OUTPATIENT)
Dept: FAMILY MEDICINE CLINIC | Age: 62
End: 2022-01-12
Payer: COMMERCIAL

## 2022-01-12 VITALS
BODY MASS INDEX: 23.76 KG/M2 | TEMPERATURE: 97.5 F | OXYGEN SATURATION: 99 % | WEIGHT: 139.2 LBS | HEIGHT: 64 IN | SYSTOLIC BLOOD PRESSURE: 124 MMHG | HEART RATE: 69 BPM | DIASTOLIC BLOOD PRESSURE: 60 MMHG

## 2022-01-12 DIAGNOSIS — C91.10 CHRONIC LYMPHOCYTIC LEUKEMIA (HCC): ICD-10-CM

## 2022-01-12 DIAGNOSIS — F41.9 ANXIETY: ICD-10-CM

## 2022-01-12 DIAGNOSIS — Z23 NEED FOR PNEUMOCOCCAL VACCINE: ICD-10-CM

## 2022-01-12 DIAGNOSIS — E78.00 PURE HYPERCHOLESTEROLEMIA: Primary | ICD-10-CM

## 2022-01-12 PROCEDURE — G8484 FLU IMMUNIZE NO ADMIN: HCPCS | Performed by: FAMILY MEDICINE

## 2022-01-12 PROCEDURE — G8420 CALC BMI NORM PARAMETERS: HCPCS | Performed by: FAMILY MEDICINE

## 2022-01-12 PROCEDURE — 90471 IMMUNIZATION ADMIN: CPT | Performed by: FAMILY MEDICINE

## 2022-01-12 PROCEDURE — 3017F COLORECTAL CA SCREEN DOC REV: CPT | Performed by: FAMILY MEDICINE

## 2022-01-12 PROCEDURE — 90732 PPSV23 VACC 2 YRS+ SUBQ/IM: CPT | Performed by: FAMILY MEDICINE

## 2022-01-12 PROCEDURE — G8427 DOCREV CUR MEDS BY ELIG CLIN: HCPCS | Performed by: FAMILY MEDICINE

## 2022-01-12 PROCEDURE — 1036F TOBACCO NON-USER: CPT | Performed by: FAMILY MEDICINE

## 2022-01-12 PROCEDURE — 99214 OFFICE O/P EST MOD 30 MIN: CPT | Performed by: FAMILY MEDICINE

## 2022-01-12 RX ORDER — ALPRAZOLAM 0.5 MG/1
TABLET ORAL
COMMUNITY
Start: 2022-01-05 | End: 2022-06-22 | Stop reason: ALTCHOICE

## 2022-01-12 ASSESSMENT — PATIENT HEALTH QUESTIONNAIRE - PHQ9
SUM OF ALL RESPONSES TO PHQ QUESTIONS 1-9: 0
1. LITTLE INTEREST OR PLEASURE IN DOING THINGS: 0
SUM OF ALL RESPONSES TO PHQ9 QUESTIONS 1 & 2: 0
SUM OF ALL RESPONSES TO PHQ QUESTIONS 1-9: 0
2. FEELING DOWN, DEPRESSED OR HOPELESS: 0

## 2022-01-12 ASSESSMENT — ENCOUNTER SYMPTOMS: BLOOD IN STOOL: 0

## 2022-01-12 NOTE — PROGRESS NOTES
TANIS[facility] Mena [facility]  [address], [address] [place]  [phone]      Akash Storey is a 58 y.o. female who presents today for her medical conditions/complaints as noted below. Akash Storey is c/o of Hypertension and Anxiety      HPI:     Pt here today for follow-up of HTN and anxiety. Seeing Dr. Jolie Rueda at [facility] every 6 months for CLL - next appt next month. Taking Imbruvica 140 mg once daily; had decreased from BID in the past 1-2 years due to HTN. No changes at her most recent visit. No longer feeling enlargement of lymph nodes.     Seeing Cardiology once yearly - last visit on 6/23. His note indicates he wanted to increase her Losartan 25 mg to twice daily; however, pt states that he verbally told her to just take 1/2 tab BID, as her BP was good at Cardio visit (124/56). Has been taking it this way x past 6 months. BP well-controlled today - 124/60. Checking BP at home every 1-2 days - usually 130-140/70's. Will go up if she has had salty food, like soup - highest reading recently 168/84. Will then try to do some yoga or lie down. Tries to walk for exercise when the weather is nice; also got a new treadmill that they will set up soon in the house. Seeing Dr. Dimitri Soria (OB-Gyn at [facility]) - last Pap negative with negative HPV on 7/10/19. Will not see her again until 5/2022. Taking Zetia 10 mg daily for HYPERLIPIDEMIA - stable. Also taking ASA 81 mg daily and fish oil. Last LP on 6/23/21 was at goal.    Taking Vit D3 2000 IU daily, Calcium 1/2 tab daily, Vit B daily, and MVI daily.     Taking Xanax 0.25 mg (1/2 of a 0.5 mg tab) daily-BID as needed for anxiety - has decreased to taking only 1/4 tab at night, instead of 1/2 tab. Working well for her.     Using OTC nasal spray and OTC allergy pill as needed for allergic rhinitis - stable; takes these most days.           Past Medical History:   Diagnosis Date    Allergic rhinitis     Anxiety     Chronic sinusitis     CLL (chronic lymphocytic leukemia) ([facility] [address]     (follows with Dr. Anirudh Michael, Oncology Dept., [address] [facility]).  Depression     Hemorrhoids     (internal and external)    Hyperlipidemia     Kidney stones     history of     Non Hodgkin's lymphoma ([facility] [address]     1998 remission for 10 years currently under [facility] care    Proctitis 2008      Past Surgical History:   Procedure Laterality Date    COLONOSCOPY  02/12/2008    (Dr. Sara Forbes) - External and internal hemorrhoids, nonspecific proctitis. (Path: Reactive change, mild).     COLONOSCOPY  03/30/2015    internal hemorrhoids [facility] Dr. Chaves See Right 2018    Dr. Hubert Leavitt - done at [address] [facility]       Family History   Problem Relation Age of Onset    Heart Disease Mother     Coronary Art Dis Sister         (CABG)    Heart Disease Sister     High Blood Pressure Sister     Thyroid Disease Sister     Hypertension Sister     Thyroid Disease Sister     Hypertension Sister     Heart Attack Brother     Coronary Art Dis Brother         (with stent placement)    Coronary Art Dis Brother         (with stent placement)    Heart Attack Brother     Thyroid Disease Brother     Heart Attack Brother     Hypertension Brother     Hypertension Brother     Heart Disease Maternal Grandmother     Heart Disease Maternal Grandfather     Early Death Paternal Grandfather         electrocution    High Cholesterol Daughter      Social History     Tobacco Use    Smoking status: Never Smoker    Smokeless tobacco: Never Used   Substance Use Topics    Alcohol use: Yes     Comment: weekly, champagne, beer      Current Outpatient Medications   Medication Sig Dispense Refill    ALPRAZolam (XANAX) 0.5 MG tablet take 1 tablet by mouth nightly if needed for sleep or anxiety      losartan (COZAAR) 25 MG tablet Take 1 tablet by mouth 2 times daily (Patient taking differently: Take 12.5 mg by mouth 2 times daily ) 60 tablet 12    ezetimibe (ZETIA) 10 MG tablet TAKE ONE TABLET BY MOUTH DAILY 90 tablet 3    mometasone (NASONEX) 50 MCG/ACT nasal spray instill 2 sprays into each nostril once daily 51 g 5    IMBRUVICA 140 MG chemo capsule Take 1 capsule by mouth daily       Multiple Vitamins-Minerals (MULTIVITAL PO) Take  by mouth.  calcium carbonate (OSCAL) 500 MG TABS tablet Take 500 mg by mouth daily.  Vitamin D (CHOLECALCIFEROL) 1000 UNITS CAPS capsule Take 2,000 Units by mouth daily       aspirin 81 MG tablet Take 81 mg by mouth daily. No current facility-administered medications for this visit. Allergies   Allergen Reactions    Latex Rash    Codeine Nausea Only and Nausea And Vomiting       Health Maintenance   Topic Date Due    Cervical cancer screen  Never done    Potassium monitoring  06/23/2022    Creatinine monitoring  06/23/2022    Depression Screen  01/12/2023    Breast cancer screen  11/18/2023    Colon cancer screen colonoscopy  03/30/2025    Lipid screen  06/23/2026    Pneumococcal 0-64 years Vaccine (3 of 4 - PPSV23) 01/12/2027    DTaP/Tdap/Td vaccine (2 - Td or Tdap) 02/15/2028    Flu vaccine  Completed    Shingles Vaccine  Completed    COVID-19 Vaccine  Completed    Hepatitis C screen  Addressed    HIV screen  Addressed    Hepatitis A vaccine  Aged Out    Hepatitis B vaccine  Aged Out    Hib vaccine  Aged Out    Meningococcal (ACWY) vaccine  Aged Out       Subjective:      Review of Systems   Constitutional: Negative for fever. Cardiovascular: Negative for chest pain and palpitations. Gastrointestinal: Negative for blood in stool. Genitourinary: Negative for dysuria and hematuria. Skin: Negative for rash and wound. Neurological: Negative for dizziness and light-headedness. Psychiatric/Behavioral: Negative for dysphoric mood and suicidal ideas. The patient is nervous/anxious (stable).         Objective:     Vitals: 01/12/22 1331   BP: 124/60   Site: Right Upper Arm   Position: Sitting   Cuff Size: Medium Adult   Pulse: 69   Temp: 97.5 °F (36.4 °C)   TempSrc: Temporal   SpO2: 99%   Weight: 139 lb 3.2 oz (63.1 kg)   Height: 5' 4\" (1.626 m)     Physical Exam  Vitals and nursing note reviewed. Constitutional:       General: She is not in acute distress. Appearance: She is well-developed. HENT:      Head: Normocephalic and atraumatic. Right Ear: Tympanic membrane, ear canal and external ear normal.      Left Ear: Tympanic membrane, ear canal and external ear normal.      Nose: Nose normal.      Mouth/Throat:      Mouth: Mucous membranes are moist.      Pharynx: Oropharynx is clear. No posterior oropharyngeal erythema. Eyes:      Conjunctiva/sclera: Conjunctivae normal.   Cardiovascular:      Rate and Rhythm: Normal rate and regular rhythm. Heart sounds: Normal heart sounds. Pulmonary:      Effort: Pulmonary effort is normal. No respiratory distress. Breath sounds: Normal breath sounds. Abdominal:      General: Bowel sounds are normal. There is no distension. Palpations: Abdomen is soft. Tenderness: There is no abdominal tenderness. Musculoskeletal:      Cervical back: Neck supple. Skin:     General: Skin is warm and dry. Neurological:      Mental Status: She is alert and oriented to person, place, and time. Assessment:      1. Pure hypercholesterolemia  -     Comprehensive Metabolic Panel; Future  -     Lipid Panel; Future  2. Anxiety  3. Chronic lymphocytic leukemia ([facility] [address]  4. Need for pneumococcal vaccine  -     PNEUMOVAX 23 subcutaneous/IM (Pneumococcal polysaccharide vaccine 23-valent >= 3yo)         Plan:      Return in about 23 weeks (around 6/22/2022) for f/u HLD, anxiety - at 11:00 am (ok per KB).     Orders Placed This Encounter   Procedures    PNEUMOVAX 23 subcutaneous/IM (Pneumococcal polysaccharide vaccine 23-valent >= 3yo)    Comprehensive Metabolic Panel Standing Status:   Future     Standing Expiration Date:   1/12/2023    Lipid Panel     Standing Status:   Future     Standing Expiration Date:   1/12/2023     Order Specific Question:   Is Patient Fasting?/# of Hours     Answer:   12     No orders of the defined types were placed in this encounter. Patient given educational materials - see patient instructions. Discussed use, benefit, and side effects of prescribed medications. All patient questions answered. Pt voiced understanding. Reviewed health maintenance.             Electronically signed by Westley Freeman DO, DO on 1/24/2022 at 12:10 AM

## 2022-03-01 DIAGNOSIS — I10 ESSENTIAL HYPERTENSION: ICD-10-CM

## 2022-03-01 DIAGNOSIS — E78.00 PURE HYPERCHOLESTEROLEMIA: ICD-10-CM

## 2022-03-01 RX ORDER — LOSARTAN POTASSIUM 25 MG/1
25 TABLET ORAL 2 TIMES DAILY
OUTPATIENT
Start: 2022-03-01 | End: 2022-05-30

## 2022-03-01 NOTE — TELEPHONE ENCOUNTER
Losartan prescribed by Dr. Jing Curiel. [facility] States she is switching pharmacies.         Maribeth Hodge called requesting a refill of the below medication which has been pended for you:     Requested Prescriptions     Pending Prescriptions Disp Refills    ezetimibe (ZETIA) 10 MG tablet 90 tablet 3     Sig: TAKE ONE TABLET BY MOUTH DAILY     Refused Prescriptions Disp Refills    losartan (COZAAR) 25 MG tablet       Sig: Take 1 tablet by mouth 2 times daily       Last Appointment Date: 1/12/2022  Next Appointment Date: 6/22/2022    Allergies   Allergen Reactions    Latex Rash    Codeine Nausea Only and Nausea And Vomiting

## 2022-03-02 RX ORDER — EZETIMIBE 10 MG/1
TABLET ORAL
Qty: 90 TABLET | Refills: 1 | Status: SHIPPED | OUTPATIENT
Start: 2022-03-02 | End: 2022-08-24

## 2022-03-14 DIAGNOSIS — I10 ESSENTIAL HYPERTENSION: ICD-10-CM

## 2022-03-14 RX ORDER — LOSARTAN POTASSIUM 25 MG/1
25 TABLET ORAL 2 TIMES DAILY
OUTPATIENT
Start: 2022-03-14 | End: 2022-06-12

## 2022-05-17 ENCOUNTER — HOSPITAL ENCOUNTER (OUTPATIENT)
Dept: LAB | Age: 62
Discharge: HOME | End: 2022-05-17
Payer: COMMERCIAL

## 2022-05-17 LAB
ABSOLUTE EOS #: 0.06 K/UL (ref 0–0.44)
ABSOLUTE IMMATURE GRANULOCYTE: <0.03 K/UL (ref 0–0.3)
ABSOLUTE LYMPH #: 1.24 K/UL (ref 1.1–3.7)
ABSOLUTE MONO #: 0.45 K/UL (ref 0.1–1.2)
ALBUMIN SERPL-MCNC: 4.3 G/DL (ref 3.5–5.2)
ALBUMIN/GLOBULIN RATIO: 1.9 (ref 1–2.5)
ALP BLD-CCNC: 71 U/L (ref 35–104)
ALT SERPL-CCNC: 13 U/L (ref 5–33)
ANION GAP SERPL CALCULATED.3IONS-SCNC: 9 MMOL/L (ref 9–17)
AST SERPL-CCNC: 15 U/L
BASOPHILS # BLD: 1 % (ref 0–2)
BASOPHILS ABSOLUTE: 0.04 K/UL (ref 0–0.2)
BILIRUB SERPL-MCNC: 0.3 MG/DL (ref 0.3–1.2)
BUN BLDV-MCNC: 11 MG/DL (ref 8–23)
BUN/CREAT BLD: 16 (ref 9–20)
CALCIUM SERPL-MCNC: 9.6 MG/DL (ref 8.6–10.4)
CHLORIDE BLD-SCNC: 101 MMOL/L (ref 98–107)
CO2: 31 MMOL/L (ref 20–31)
CREAT SERPL-MCNC: 0.67 MG/DL (ref 0.5–0.9)
EOSINOPHILS RELATIVE PERCENT: 1 % (ref 1–4)
GFR AFRICAN AMERICAN: >60 ML/MIN
GFR NON-AFRICAN AMERICAN: >60 ML/MIN
GFR SERPL CREATININE-BSD FRML MDRD: ABNORMAL ML/MIN/{1.73_M2}
GLUCOSE BLD-MCNC: 109 MG/DL (ref 70–99)
HCT VFR BLD CALC: 40.5 % (ref 36.3–47.1)
HEMOGLOBIN: 13.5 G/DL (ref 11.9–15.1)
IMMATURE GRANULOCYTES: 0 %
LACTATE DEHYDROGENASE: 153 U/L (ref 135–214)
LYMPHOCYTES # BLD: 23 % (ref 24–43)
MCH RBC QN AUTO: 30.8 PG (ref 25.2–33.5)
MCHC RBC AUTO-ENTMCNC: 33.3 G/DL (ref 25.2–33.5)
MCV RBC AUTO: 92.5 FL (ref 82.6–102.9)
MONOCYTES # BLD: 8 % (ref 3–12)
NRBC AUTOMATED: 0 PER 100 WBC
PDW BLD-RTO: 13.7 % (ref 11.8–14.4)
PLATELET # BLD: 276 K/UL (ref 138–453)
PMV BLD AUTO: 12.2 FL (ref 8.1–13.5)
POTASSIUM SERPL-SCNC: 4.1 MMOL/L (ref 3.7–5.3)
RBC # BLD: 4.38 M/UL (ref 3.95–5.11)
SEG NEUTROPHILS: 67 % (ref 36–65)
SEGMENTED NEUTROPHILS ABSOLUTE COUNT: 3.69 K/UL (ref 1.5–8.1)
SODIUM BLD-SCNC: 141 MMOL/L (ref 135–144)
TOTAL PROTEIN: 6.6 G/DL (ref 6.4–8.3)
WBC # BLD: 5.5 K/UL (ref 3.5–11.3)

## 2022-05-17 PROCEDURE — 36415 COLL VENOUS BLD VENIPUNCTURE: CPT

## 2022-05-17 PROCEDURE — 83615 LACTATE (LD) (LDH) ENZYME: CPT

## 2022-05-17 PROCEDURE — 85025 COMPLETE CBC W/AUTO DIFF WBC: CPT

## 2022-05-17 PROCEDURE — 80053 COMPREHEN METABOLIC PANEL: CPT

## 2022-06-14 ENCOUNTER — HOSPITAL ENCOUNTER (OUTPATIENT)
Dept: LAB | Age: 62
Discharge: HOME | End: 2022-06-14
Payer: COMMERCIAL

## 2022-06-14 DIAGNOSIS — E78.00 PURE HYPERCHOLESTEROLEMIA: ICD-10-CM

## 2022-06-14 LAB
ALBUMIN SERPL-MCNC: 4.3 G/DL (ref 3.5–5.2)
ALBUMIN/GLOBULIN RATIO: 1.9 (ref 1–2.5)
ALP BLD-CCNC: 74 U/L (ref 35–104)
ALT SERPL-CCNC: 23 U/L (ref 5–33)
ANION GAP SERPL CALCULATED.3IONS-SCNC: 8 MMOL/L (ref 9–17)
AST SERPL-CCNC: 20 U/L
BILIRUB SERPL-MCNC: 0.57 MG/DL (ref 0.3–1.2)
BUN BLDV-MCNC: 11 MG/DL (ref 8–23)
BUN/CREAT BLD: 19 (ref 9–20)
CALCIUM SERPL-MCNC: 9.8 MG/DL (ref 8.6–10.4)
CHLORIDE BLD-SCNC: 105 MMOL/L (ref 98–107)
CO2: 28 MMOL/L (ref 20–31)
CREAT SERPL-MCNC: 0.57 MG/DL (ref 0.5–0.9)
GFR AFRICAN AMERICAN: >60 ML/MIN
GFR NON-AFRICAN AMERICAN: >60 ML/MIN
GFR SERPL CREATININE-BSD FRML MDRD: ABNORMAL ML/MIN/{1.73_M2}
GLUCOSE BLD-MCNC: 96 MG/DL (ref 70–99)
POTASSIUM SERPL-SCNC: 4.8 MMOL/L (ref 3.7–5.3)
SODIUM BLD-SCNC: 141 MMOL/L (ref 135–144)
TOTAL PROTEIN: 6.6 G/DL (ref 6.4–8.3)

## 2022-06-14 PROCEDURE — 80053 COMPREHEN METABOLIC PANEL: CPT

## 2022-06-14 PROCEDURE — 36415 COLL VENOUS BLD VENIPUNCTURE: CPT

## 2022-06-14 PROCEDURE — 80061 LIPID PANEL: CPT

## 2022-06-15 LAB
CHOLESTEROL/HDL RATIO: 3
CHOLESTEROL: 231 MG/DL
HDLC SERPL-MCNC: 77 MG/DL
LDL CHOLESTEROL: 141 MG/DL (ref 0–130)
TRIGL SERPL-MCNC: 66 MG/DL

## 2022-06-22 ENCOUNTER — OFFICE VISIT (OUTPATIENT)
Dept: CARDIOLOGY | Age: 62
End: 2022-06-22
Payer: COMMERCIAL

## 2022-06-22 ENCOUNTER — OFFICE VISIT (OUTPATIENT)
Dept: FAMILY MEDICINE CLINIC | Age: 62
End: 2022-06-22
Payer: COMMERCIAL

## 2022-06-22 VITALS
WEIGHT: 140.8 LBS | DIASTOLIC BLOOD PRESSURE: 80 MMHG | BODY MASS INDEX: 24.04 KG/M2 | HEIGHT: 64 IN | TEMPERATURE: 97.2 F | HEART RATE: 70 BPM | SYSTOLIC BLOOD PRESSURE: 124 MMHG | OXYGEN SATURATION: 99 %

## 2022-06-22 VITALS
DIASTOLIC BLOOD PRESSURE: 80 MMHG | HEART RATE: 67 BPM | BODY MASS INDEX: 24.11 KG/M2 | SYSTOLIC BLOOD PRESSURE: 132 MMHG | OXYGEN SATURATION: 98 % | RESPIRATION RATE: 16 BRPM | WEIGHT: 141.2 LBS | HEIGHT: 64 IN

## 2022-06-22 DIAGNOSIS — J30.9 ALLERGIC RHINITIS, UNSPECIFIED SEASONALITY, UNSPECIFIED TRIGGER: ICD-10-CM

## 2022-06-22 DIAGNOSIS — Z78.0 POST-MENOPAUSAL: ICD-10-CM

## 2022-06-22 DIAGNOSIS — I10 ESSENTIAL HYPERTENSION: Primary | ICD-10-CM

## 2022-06-22 DIAGNOSIS — C91.10 CHRONIC LYMPHOCYTIC LEUKEMIA (HCC): ICD-10-CM

## 2022-06-22 DIAGNOSIS — E78.5 HYPERLIPIDEMIA, UNSPECIFIED HYPERLIPIDEMIA TYPE: ICD-10-CM

## 2022-06-22 DIAGNOSIS — E78.00 PURE HYPERCHOLESTEROLEMIA: ICD-10-CM

## 2022-06-22 PROBLEM — C44.92 SQUAMOUS CELL SKIN CANCER: Status: ACTIVE | Noted: 2022-06-22

## 2022-06-22 PROBLEM — S83.221A PERIPHERAL TEAR OF MEDIAL MENISCUS OF RIGHT KNEE AS CURRENT INJURY: Status: ACTIVE | Noted: 2018-08-20

## 2022-06-22 PROCEDURE — 99214 OFFICE O/P EST MOD 30 MIN: CPT | Performed by: INTERNAL MEDICINE

## 2022-06-22 PROCEDURE — 93000 ELECTROCARDIOGRAM COMPLETE: CPT | Performed by: INTERNAL MEDICINE

## 2022-06-22 PROCEDURE — 99214 OFFICE O/P EST MOD 30 MIN: CPT | Performed by: FAMILY MEDICINE

## 2022-06-22 ASSESSMENT — ENCOUNTER SYMPTOMS: BLOOD IN STOOL: 0

## 2022-06-22 NOTE — PROGRESS NOTES
TANISHA Mena [facility]  [address], [address] [place]  [phone]      Edward Crabtree is a 58 y.o. female who presents today for her medical conditions/complaints as noted below. Edward Crabtree is c/o of Hyperlipidemia and Anxiety      HPI:     Pt here today for follow-up of HYPERLIPIDEMIA and anxiety. Taking Zetia 10 mg daily and ASA 81 mg daily per Cardiology for [address]. Per their note from [address] earlier today, pt does not want to add statin and wants to work on diet/exercise for 3 months to lower levels (current  and total 231). Cardio will re-check LP again in 3 months. BP well-controlled today - 124/80. Checking BP at home intermittently - 120-130/70-80's. Taking Losartan 12.5 mg (1/2 of 25 mg tab) BID for HTN. Seeing Oncology at [facility] every 6 months for CLL; next visit in 11/2022. Taking 140 mg Imbruvica daily. [facility] to [facility] for Evusheld injection on 3/17/22 for Covid. Not due for Pap this year; saw OB-Gyn on 5/25. [facility] mamm[facility]. Still taking MVI, Calcium, and VIt D3 2000 IU daily. Uses OTC nasal spray intermittently as needed, as well as Claritin daily. Stopped taking Xanax after last OV - wasn't helping with sleep anymore. Takes OTC sleep aide only infrequently as needed - has not taken in the past 2 months. Past Medical History:   Diagnosis Date    Allergic rhinitis     Anxiety     Chronic sinusitis     CLL (chronic lymphocytic leukemia) ([facility] [address]     (follows with Dr. Carmina Wick, Oncology Dept., [address] [facility]).     Depression     Hemorrhoids     (internal and external)    Hyperlipidemia     Kidney stones     history of     Non Hodgkin's lymphoma ([facility] [address]     1998 remission for 10 years currently under [facility] care    Proctitis 2008      Past Surgical History:   Procedure Laterality Date    COLONOSCOPY  02/12/2008    (Dr. Sharyle King) - External and internal hemorrhoids, nonspecific proctitis. (Path: Reactive change, mild).  COLONOSCOPY  03/30/2015    internal hemorrhoids [facility] Dr. Obed Russell Right 2018    Dr. Leo Hazard - done at [address] [facility]       Family History   Problem Relation Age of Onset    Heart Disease Mother     Coronary Art Dis Sister         (CABG)    Heart Disease Sister     High Blood Pressure Sister     Thyroid Disease Sister     Hypertension Sister     Thyroid Disease Sister     Hypertension Sister     Heart Attack Brother     Coronary Art Dis Brother         (with stent placement)    Coronary Art Dis Brother         (with stent placement)    Heart Attack Brother     Thyroid Disease Brother     Heart Attack Brother     Hypertension Brother     Hypertension Brother     Heart Disease Maternal Grandmother     Heart Disease Maternal Grandfather     Early Death Paternal Grandfather         electrocution    High Cholesterol Daughter      Social History     Tobacco Use    Smoking status: Never Smoker    Smokeless tobacco: Never Used   Substance Use Topics    Alcohol use: Yes     Comment: weekly, champagne, beer      Current Outpatient Medications   Medication Sig Dispense Refill    ezetimibe (ZETIA) 10 MG tablet TAKE ONE TABLET BY MOUTH DAILY 90 tablet 1    mometasone (NASONEX) 50 MCG/ACT nasal spray instill 2 sprays into each nostril once daily 51 g 5    IMBRUVICA 140 MG chemo capsule Take 1 capsule by mouth daily       Multiple Vitamins-Minerals (MULTIVITAL PO) Take  by mouth.  calcium carbonate (OSCAL) 500 MG TABS tablet Take 500 mg by mouth daily.  Vitamin D (CHOLECALCIFEROL) 1000 UNITS CAPS capsule Take 2,000 Units by mouth daily       aspirin 81 MG tablet Take 81 mg by mouth daily.  losartan (COZAAR) 25 MG tablet Take 0.5 tablets by mouth 2 times daily 90 tablet 3     No current facility-administered medications for this visit.      Allergies Allergen Reactions    Latex Rash    Codeine Nausea Only and Nausea And Vomiting       Health Maintenance   Topic Date Due    Cervical cancer screen  Never done    COVID-19 Vaccine (3 - Booster for Inder series) 12/30/2021    Depression Screen  01/12/2023    Breast cancer screen  11/18/2023    Colorectal Cancer Screen  03/30/2025    Pneumococcal 0-64 years Vaccine (3 - PPSV23 or PCV20) 01/12/2027    Lipids  06/14/2027    DTaP/Tdap/Td vaccine (2 - Td or Tdap) 02/15/2028    Flu vaccine  Completed    Shingles vaccine  Completed    Hepatitis C screen  Addressed    HIV screen  Addressed    Hepatitis A vaccine  Aged Out    Hepatitis B vaccine  Aged Out    Hib vaccine  Aged Out    Meningococcal (ACWY) vaccine  Aged Out       Subjective:      Review of Systems   Constitutional: Negative for unexpected weight change. Cardiovascular: Negative for chest pain and palpitations. Gastrointestinal: Negative for blood in stool. Genitourinary: Negative for dysuria and hematuria. Skin: Negative for rash and wound. Neurological: Negative for dizziness and light-headedness. Psychiatric/Behavioral: Negative for dysphoric mood and suicidal ideas. Objective:     Vitals:    06/22/22 1122   BP: 124/80   Site: Right Upper Arm   Position: Sitting   Cuff Size: Medium Adult   Pulse: 70   Temp: 97.2 °F (36.2 °C)   TempSrc: Temporal   SpO2: 99%   Weight: 140 lb 12.8 oz (63.9 kg)   Height: 5' 4\" (1.626 m)     Physical Exam  Vitals and nursing note reviewed. Constitutional:       General: She is not in acute distress. Appearance: She is well-developed. HENT:      Head: Normocephalic and atraumatic. Right Ear: Tympanic membrane, ear canal and external ear normal.      Left Ear: Tympanic membrane, ear canal and external ear normal.      Nose: Nose normal.      Mouth/Throat:      Mouth: Mucous membranes are moist.      Pharynx: Oropharynx is clear. No posterior oropharyngeal erythema.    Eyes: Conjunctiva/sclera: Conjunctivae normal.   Cardiovascular:      Rate and Rhythm: Normal rate and regular rhythm. Heart sounds: Normal heart sounds. Pulmonary:      Effort: Pulmonary effort is normal. No respiratory distress. Breath sounds: Normal breath sounds. Abdominal:      General: Bowel sounds are normal. There is no distension. Palpations: Abdomen is soft. Tenderness: There is no abdominal tenderness. Musculoskeletal:      Cervical back: Neck supple. Skin:     General: Skin is warm and dry. Neurological:      Mental Status: She is alert and oriented to person, place, and time. Assessment:      1. Essential hypertension  -     Comprehensive Metabolic Panel; Future  2. Pure hypercholesterolemia  3. Chronic lymphocytic leukemia ([facility] [address]  4. Allergic rhinitis, unspecified seasonality, unspecified trigger  5. Post-menopausal  -     DEXA AXIAL SKELETON W VERTEBRAL FX ASST; Future         Plan:      Return in about 7 months (around 1/9/2023) for f/u HTN, HLD. Orders Placed This Encounter   Procedures    DEXA AXIAL SKELETON W VERTEBRAL FX ASST     Standing Status:   Future     Standing Expiration Date:   6/22/2023     Order Specific Question:   Reason for exam:     Answer:   post-menopausal    Comprehensive Metabolic Panel     Standing Status:   Future     Standing Expiration Date:   12/22/2023     No orders of the defined types were placed in this encounter. Patient given educational materials - see patient instructions. Discussed use, benefit, and side effects of prescribed medications. All patient questions answered. Pt voiced understanding. Reviewed health maintenance.             Electronically signed by Meryl Johnson DO, DO on 6/30/2022 at 11:28 PM

## 2022-06-22 NOTE — PROGRESS NOTES
Today's Date: 6/22/2022  Patient's Name: Fela Lackey  Patient's age: 58 y. o., 1960    Subjective: Fela Lackey is being seen in clinic today regarding HTN, HL    she is doing well from a cardiac standpoint. No chest pain, no dyspnea, no PND, no syncope or pre-syncope, no orthopnea. BP at home 120-130/70    Past Medical History:   has a past medical history of Allergic rhinitis, Anxiety, Chronic sinusitis, CLL (chronic lymphocytic leukemia) ([facility] [address] Depression, Hemorrhoids, Hyperlipidemia, Kidney stones, Non Hodgkin's lymphoma ([facility] [address] and Proctitis. Past Surgical History:   has a past surgical history that includes Tubal ligation; Colonoscopy (02/12/2008); lymph node biopsy (1998); Colonoscopy (03/30/2015); and Knee arthroscopy (Right, 2018). Home Medications:  Prior to Admission medications    Medication Sig Start Date End Date Taking? Authorizing Provider   ezetimibe (ZETIA) 10 MG tablet TAKE ONE TABLET BY MOUTH DAILY 3/2/22   Mady Shaffer,    ALPRAZolam Adore Be) 0.5 MG tablet take 1 tablet by mouth nightly if needed for sleep or anxiety 1/5/22   Historical Provider, MD   losartan (COZAAR) 25 MG tablet Take 1 tablet by mouth 2 times daily  Patient taking differently: Take 12.5 mg by mouth 2 times daily  6/23/21   Nicky Child MD   mometasone (NASONEX) 50 MCG/ACT nasal spray instill 2 sprays into each nostril once daily 1/9/20   TRAVIS Ballard   IMBRUVICA 140 MG chemo capsule Take 1 capsule by mouth daily  5/8/17   Historical Provider, MD   Multiple Vitamins-Minerals (MULTIVITAL PO) Take  by mouth. Historical Provider, MD   calcium carbonate (OSCAL) 500 MG TABS tablet Take 500 mg by mouth daily. Historical Provider, MD   Vitamin D (CHOLECALCIFEROL) 1000 UNITS CAPS capsule Take 2,000 Units by mouth daily     Historical Provider, MD   aspirin 81 MG tablet Take 81 mg by mouth daily.     Historical Provider, MD       Allergies:  Latex and Codeine    Social History:   reports that she has never smoked. She has never used smokeless tobacco. She reports current alcohol use. She reports that she does not use drugs. Family History: family history includes Coronary Art Dis in her brother, brother, and sister; Early Death in her paternal grandfather; Heart Attack in her brother, brother, and brother; Heart Disease in her maternal grandfather, maternal grandmother, mother, and sister; High Blood Pressure in her sister; High Cholesterol in her daughter; Hypertension in her brother, brother, sister, and sister; Thyroid Disease in her brother, sister, and sister. No h/o sudden cardiac death. No for premature CAD    REVIEW OF SYSTEMS:    · Constitutional: there has been no unanticipated weight loss. There's been No change in energy level, No change in activity level. · Eyes: No visual changes or diplopia. No scleral icterus. · ENT: No Headaches, hearing loss or vertigo. No mouth sores or sore throat. · Cardiovascular: see above  · Respiratory: see above  · Gastrointestinal: No abdominal pain, appetite loss, blood in stools. · Genitourinary: No dysuria, trouble voiding, or hematuria. · Musculoskeletal:  No gait disturbance, No weakness or joint complaints. · Integumentary: No rash or pruritis. · Neurological: No headache or diplopia. No tingling  · Psychiatric: No anxiety, or depression. · Endocrine: No temperature intolerance. · Hematologic/Lymphatic: has CLL  · Allergic/Immunologic: No nasal congestion or hives. PHYSICAL EXAM:      Vitals:    06/22/22 1028   BP: 132/80   Pulse: 67   Resp: 16   SpO2: 98%       Constitutional and General Appearance: alert, cooperative, no distress and appears stated age  HEENT: PERRL, no cervical lymphadenopathy. No masses palpable. Normal oral mucosa  Respiratory:  · Normal excursion and expansion without use of accessory muscles  · Resp Auscultation: Good respiratory effort. No for increased work of breathing.  On auscultation: clear to auscultation bilaterally  Cardiovascular:  · Heart tones are crisp and normal. regular S1 and S2.  · Jugular venous pulsation Normal  · The carotid upstroke is normal in amplitude and contour without delay or bruit   Abdomen:   · soft  · Bowel sounds present  Extremities:  ·  No edema  Neurological:  · Alert and oriented. Cardiac Data:  EKG: NSR, NL ECG    Labs:     CBC: No results for input(s): WBC, HGB, HCT, PLT in the last 72 hours. BMP: No results for input(s): NA, K, CO2, BUN, CREATININE, LABGLOM, GLUCOSE in the last 72 hours. PT/INR: No results for input(s): PROTIME, INR in the last 72 hours. FASTING LIPID PANEL:  Lab Results   Component Value Date    HDL 77 06/14/2022    TRIG 66 06/14/2022     LIVER PROFILE:No results for input(s): AST, ALT, LABALBU in the last 72 hours. Problem List:  Patient Active Problem List   Diagnosis    Chronic lymphocytic leukemia ([facility] [address]    Anxiety    Non Hodgkin's lymphoma ([facility] [address]    Kidney stones    Allergic rhinitis    Chronic sinusitis    Other hyperlipidemia        ASSESSMENT/PLAN:  - Hypertension. Continue losartan. Patient to monitor BP at home. BP goal <130/80. HTN likely related to [address] therapy. Counseled regarding low-salt diet.     -TTE 11/28/14 showed LVEF 68%, mild MR/TR, grade II DD. Treadmill stress test 4/14/15: 13.4 METS and no ischemic changes.     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 On PO chemotherapy- Imbruvica     -Hyperlipidemia. - 5/21/20. LDL now 141 with  on 6/14/22. On zetia. She does not want to add statin and wants to diet and exercise for 3 months.  Will obtain lipid panel in 3 months.     -Continue diet and exercise.     -RTC 12 months    Carole Robb MD  [facility]  [phone]

## 2022-06-22 NOTE — PATIENT INSTRUCTIONS
Patient Education        Learning About High Cholesterol  What is high cholesterol? High cholesterol means that you have too much cholesterol in your blood. Cholesterol is a type of fat. It's needed for many body functions, such as making new cells. Cholesterol is made by your body. It also comes from food youeat. Having high cholesterol can lead to the buildup of plaque in artery walls. Thiscan increase your risk of heart attack and stroke. When your doctor talks about high cholesterol levels, your doctor is talking about your total cholesterol and LDL cholesterol (the \"bad\" cholesterol) levels. Your doctor may also speak about HDL (the \"good\" cholesterol) levels. High HDL is linked with a lower risk for coronary artery disease, heart attack,and stroke. Your cholesterol levels help your doctor find out your risk for having a heartattack or stroke. How can you help prevent high cholesterol? A heart-healthy lifestyle can help you prevent high cholesterol and lower yourrisk for a heart attack and stroke.  Eat heart-healthy foods. ? Eat fruits, vegetables, whole grains, beans, and other high-fiber foods. ? Eat lean proteins, such as seafood, lean meats, beans, nuts, and soy products. ? Eat healthy fats, such as canola and olive oil. ? Choose foods that are low in saturated fat. ? Limit sodium and alcohol. ? Limit drinks and foods with added sugar.  Be active. Try to do moderate activity at least 2½ hours a week. Or try vigorous activity at least 1¼ hours a week. You may want to walk or try other activities, such as running, swimming, cycling, or playing tennis or team sports.  Stay at a healthy weight. Lose weight if you need to.  Don't smoke. If you need help quitting, talk to your doctor about stop-smoking programs and medicines. These can increase your chances of quitting for good. How is high cholesterol treated?   The goal of treatment is to reduce your chances of having a heart attack orstroke. The goal is not to lower your cholesterol numbers only.  Have a heart-healthy lifestyle. This includes eating healthy foods, not smoking, losing weight, and being more active.  You may choose to take medicine. Follow-up care is a key part of your treatment and safety. Be sure to make and go to all appointments, and call your doctor if you are having problems. It's also a good idea to know your test results and keep alist of the medicines you take. Where can you learn more? Go to https://[employer]. org and sign in to your [employer] account. Enter V832 in the [employer] box to learn more about \"Learning About High Cholesterol. \"     If you do not have an account, please click on the \"Sign Up Now\" link. Current as of: January 10, 2022               Content Version: 13.3  © 2006-2022 Healthwise, Incorporated. Care instructions adapted under license by [facility] ([facility]). If you have questions about a medical condition or this instruction, always ask your healthcare professional. [address] any warranty or liability for your use of this information.

## 2022-06-26 DIAGNOSIS — I10 ESSENTIAL HYPERTENSION: ICD-10-CM

## 2022-06-27 DIAGNOSIS — I10 ESSENTIAL HYPERTENSION: ICD-10-CM

## 2022-06-27 RX ORDER — LOSARTAN POTASSIUM 25 MG/1
TABLET ORAL
Qty: 60 TABLET | Refills: 12 | OUTPATIENT
Start: 2022-06-27

## 2022-06-27 RX ORDER — LOSARTAN POTASSIUM 25 MG/1
25 TABLET ORAL 2 TIMES DAILY
Qty: 60 TABLET | Refills: 12 | OUTPATIENT
Start: 2022-06-27

## 2022-06-29 DIAGNOSIS — I10 ESSENTIAL HYPERTENSION: ICD-10-CM

## 2022-06-29 RX ORDER — LOSARTAN POTASSIUM 25 MG/1
12.5 TABLET ORAL 2 TIMES DAILY
Qty: 90 TABLET | Refills: 3 | Status: SHIPPED | OUTPATIENT
Start: 2022-06-29 | End: 2022-06-30 | Stop reason: SDUPTHER

## 2022-06-29 NOTE — TELEPHONE ENCOUNTER
The patient called regarding her Cozaar, it looks like it was refused twice. Please call the patient and inform her why this was done or please send to her pharmacy, [facility] in [place], [place]. It was last sent in in June 2021 and the patient stated her pharmacy has no more refills.      Last Appt:  6/22/2022  Next Appt:   6/28/2023  Med verified in [address] [place] [address] Rd

## 2022-06-30 DIAGNOSIS — I10 ESSENTIAL HYPERTENSION: ICD-10-CM

## 2022-06-30 RX ORDER — LOSARTAN POTASSIUM 25 MG/1
12.5 TABLET ORAL 2 TIMES DAILY
Qty: 90 TABLET | Refills: 3 | Status: SHIPPED | OUTPATIENT
Start: 2022-06-30

## 2022-08-22 DIAGNOSIS — E78.00 PURE HYPERCHOLESTEROLEMIA: ICD-10-CM

## 2022-08-22 NOTE — TELEPHONE ENCOUNTER
[facility] called requesting a refill of the below medication which has been pended for you:     Requested Prescriptions     Pending Prescriptions Disp Refills    ezetimibe (ZETIA) 10 MG tablet [Pharmacy Med Name: EZETIMIBE TABS 10MG] 90 tablet 3     Sig: TAKE 1 TABLET DAILY       Last Appointment Date: 6/22/2022  Next Appointment Date: 1/23/2023    Allergies   Allergen Reactions    Latex Rash    Codeine Nausea Only and Nausea And Vomiting

## 2022-08-24 RX ORDER — EZETIMIBE 10 MG/1
TABLET ORAL
Qty: 90 TABLET | Refills: 3 | Status: SHIPPED | OUTPATIENT
Start: 2022-08-24

## 2022-10-05 ENCOUNTER — HOSPITAL ENCOUNTER (OUTPATIENT)
Dept: LAB | Age: 62
Discharge: HOME | End: 2022-10-05
Payer: COMMERCIAL

## 2022-10-05 DIAGNOSIS — E78.5 HYPERLIPIDEMIA, UNSPECIFIED HYPERLIPIDEMIA TYPE: ICD-10-CM

## 2022-10-05 LAB
CHOLESTEROL/HDL RATIO: 3.1
CHOLESTEROL: 207 MG/DL
HDLC SERPL-MCNC: 66 MG/DL
LDL CHOLESTEROL: 127 MG/DL (ref 0–130)
TRIGL SERPL-MCNC: 68 MG/DL

## 2022-10-05 PROCEDURE — 36415 COLL VENOUS BLD VENIPUNCTURE: CPT

## 2022-10-05 PROCEDURE — 80061 LIPID PANEL: CPT

## 2022-10-07 ENCOUNTER — TELEPHONE (OUTPATIENT)
Dept: CARDIOLOGY | Age: 62
End: 2022-10-07

## 2022-10-07 NOTE — TELEPHONE ENCOUNTER
I spoke to pt by phone. Her plan will be to improve her diet and increase exercise. Dr. Megan Hartman has lab work ordered for the spring (May). Pt wants to wait until that lab draw to decide about adding a statin or not.

## 2022-10-07 NOTE — TELEPHONE ENCOUNTER
Qing Reyes LPN   39/3/2135 07:50 AM EDT Back to Top      Pt aware of results and Dr. Rosaline Moralez recommendation. She says she is driving and will call us back. She will \"think about it. \"    Elian Borges MD   10/6/2022  9:24 AM EDT       . It was 141 last time. LDL was 108 one and two years ago. Continue zetia. Recommend low dose of lipitor 10mg po qhs. Discuss with patient. Patient called office back stating that should would like Tad Brannon [phone] to call her back.

## 2023-01-12 ENCOUNTER — OFFICE VISIT (OUTPATIENT)
Dept: PRIMARY CARE CLINIC | Age: 63
End: 2023-01-12

## 2023-01-12 ENCOUNTER — TELEPHONE (OUTPATIENT)
Dept: PRIMARY CARE CLINIC | Age: 63
End: 2023-01-12

## 2023-01-12 ENCOUNTER — HOSPITAL ENCOUNTER (OUTPATIENT)
Dept: GENERAL RADIOLOGY | Age: 63
Discharge: HOME | End: 2023-01-14
Payer: COMMERCIAL

## 2023-01-12 VITALS
HEIGHT: 64 IN | OXYGEN SATURATION: 98 % | RESPIRATION RATE: 20 BRPM | DIASTOLIC BLOOD PRESSURE: 94 MMHG | SYSTOLIC BLOOD PRESSURE: 142 MMHG | BODY MASS INDEX: 25.27 KG/M2 | HEART RATE: 76 BPM | WEIGHT: 148 LBS

## 2023-01-12 DIAGNOSIS — M54.6 ACUTE BILATERAL THORACIC BACK PAIN: ICD-10-CM

## 2023-01-12 DIAGNOSIS — M54.50 LUMBAR BACK PAIN: ICD-10-CM
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
## 2023-01-12 DIAGNOSIS — S22.070A CLOSED WEDGE COMPRESSION FRACTURE OF T10 VERTEBRA, INITIAL ENCOUNTER (HCC): ICD-10-CM

## 2023-01-12 PROCEDURE — 72100 X-RAY EXAM L-S SPINE 2/3 VWS: CPT

## 2023-01-12 PROCEDURE — 72072 X-RAY EXAM THORAC SPINE 3VWS: CPT

## 2023-01-12 RX ORDER — TIZANIDINE 4 MG/1
4 TABLET ORAL EVERY 6 HOURS PRN
Qty: 30 TABLET | Refills: 0 | Status: SHIPPED | OUTPATIENT
Start: 2023-01-12

## 2023-01-12 RX ORDER — HYDROCODONE BITARTRATE AND ACETAMINOPHEN 5; 325 MG/1; MG/1
1 TABLET ORAL EVERY 6 HOURS PRN
Qty: 12 TABLET | Refills: 0 | Status: SHIPPED | OUTPATIENT
Start: 2023-01-12 | End: 2023-01-15

## 2023-01-12 RX ORDER — TIZANIDINE 4 MG/1
4 TABLET ORAL EVERY 6 HOURS PRN
Qty: 30 TABLET | Refills: 0 | Status: SHIPPED | OUTPATIENT
Start: 2023-01-12 | End: 2023-01-12

## 2023-01-12 RX ORDER — HYDROCODONE BITARTRATE AND ACETAMINOPHEN 5; 325 MG/1; MG/1
1 TABLET ORAL EVERY 6 HOURS PRN
Qty: 12 TABLET | Refills: 0 | Status: SHIPPED | OUTPATIENT
Start: 2023-01-12 | End: 2023-01-12

## 2023-01-12 ASSESSMENT — ENCOUNTER SYMPTOMS: BACK PAIN: 1

## 2023-01-12 NOTE — PROGRESS NOTES
Subjective:      Patient ID: Vernia Dubin is a 58 y.o. female coming in for   Chief Complaint   Patient presents with    Fall     Fall today down several steps. Pain across the middle of her back. Pain 9.5/10 today. Took several tylenol. HPI  Fall today. Pt was going down steps. Lost her footing, landing on her buttocks and rasheeda her spine from her lumbar up to thoracic region. Fall occurred around 11:30am today. No LOC. No pain over tailbone just lumbar region through thoracic, also having some paralumbar and parathoracaic pain. Did try tylenol with no relief. Pain with bending forward and deep breathing. Review of Systems   Musculoskeletal:  Positive for arthralgias and back pain. All other systems reviewed and are negative. Objective:BP (!) 142/94   Pulse 76   Resp 20   Ht 5' 4\" (1.626 m)   Wt 148 lb (67.1 kg)   SpO2 98%   BMI 25.40 kg/m²      Physical Exam  Vitals and nursing note reviewed. Constitutional:       Appearance: Normal appearance. She is not ill-appearing. Pulmonary:      Effort: Pulmonary effort is normal.   Musculoskeletal:      Thoracic back: Spasms and tenderness (parathoracic) present. No swelling. Decreased range of motion. Lumbar back: Spasms present. No tenderness. Decreased range of motion. Skin:     General: Skin is warm. Neurological:      General: No focal deficit present. Mental Status: She is alert and oriented to person, place, and time. Assessment:      1. Fall (on) (from) other stairs and steps, initial encounter    2. Lumbar back pain    3. Acute bilateral thoracic back pain           Plan:   -will order xrays to rule out any fractures  -tizanidine and norco for pain/spasms  -tylenol/motrin for mild pain  -ice today  -transition to heat tomorrow   -xray showed t10-11 compression fracture. I notified pt of findings. I am referring pt to pain mgmt for possible kyphoplasty, order in epic.      Orders Placed This Encounter   Procedures XR LUMBAR SPINE (2-3 VIEWS)     Standing Status:   Future     Standing Expiration Date:   1/12/2024     Order Specific Question:   Reason for exam:     Answer:   fell going down steps, pain to lumbar through thoracic    XR THORACIC SPINE (3 VIEWS)     Standing Status:   Future     Standing Expiration Date:   1/12/2024     Order Specific Question:   Reason for exam:     Answer:   fell down steps today, pain from lumbar through thoracic      Outpatient Encounter Medications as of 1/12/2023   Medication Sig Dispense Refill    HYDROcodone-acetaminophen (NORCO) 5-325 MG per tablet Take 1 tablet by mouth every 6 hours as needed for Pain for up to 3 days. Intended supply: 3 days. Take lowest dose possible to manage pain Max Daily Amount: 4 tablets 12 tablet 0    tiZANidine (ZANAFLEX) 4 MG tablet Take 1 tablet by mouth every 6 hours as needed (back spasms) 30 tablet 0    ezetimibe (ZETIA) 10 MG tablet TAKE 1 TABLET DAILY 90 tablet 3    losartan (COZAAR) 25 MG tablet Take 0.5 tablets by mouth 2 times daily 90 tablet 3    IMBRUVICA 140 MG chemo capsule Take 1 capsule by mouth daily       Multiple Vitamins-Minerals (MULTIVITAL PO) Take  by mouth.      calcium carbonate (OSCAL) 500 MG TABS tablet Take 500 mg by mouth daily. Vitamin D (CHOLECALCIFEROL) 1000 UNITS CAPS capsule Take 2,000 Units by mouth daily       aspirin 81 MG tablet Take 81 mg by mouth daily. [DISCONTINUED] mometasone (NASONEX) 50 MCG/ACT nasal spray instill 2 sprays into each nostril once daily (Patient not taking: Reported on 1/12/2023) 51 g 5     No facility-administered encounter medications on file as of 1/12/2023.             Olivia Tovar, APRN - CNP

## 2023-01-13 ENCOUNTER — TELEPHONE (OUTPATIENT)
Dept: PRIMARY CARE CLINIC | Age: 63
End: 2023-01-13

## 2023-01-13 DIAGNOSIS — S22.070A CLOSED WEDGE COMPRESSION FRACTURE OF T10 VERTEBRA, INITIAL ENCOUNTER (HCC): Primary | ICD-10-CM

## 2023-01-13 NOTE — TELEPHONE ENCOUNTER
Patient was seen 1/12/2023 for back pain. She called pain management and was told to have MRI prior to her visit. Appt is 1/24/2023. Can you order MRI or should this be sent to Dr. Mayte Hunter? Also concerned that she will run out of pain meds prior to pain management appt. She is willing to drive to [facility] if appt can be sooner. Please advise.

## 2023-01-16 DIAGNOSIS — M54.50 LUMBAR BACK PAIN: ICD-10-CM

## 2023-01-16 DIAGNOSIS — M54.6 ACUTE BILATERAL THORACIC BACK PAIN: ICD-10-CM

## 2023-01-16 RX ORDER — HYDROCODONE BITARTRATE AND ACETAMINOPHEN 5; 325 MG/1; MG/1
1 TABLET ORAL EVERY 6 HOURS PRN
Qty: 20 TABLET | Refills: 0 | Status: SHIPPED | OUTPATIENT
Start: 2023-01-16 | End: 2023-01-21

## 2023-01-16 NOTE — TELEPHONE ENCOUNTER
----- Message from Ramses Mckee sent at 1/16/2023 10:25 AM EST -----  Subject: Refill Request    QUESTIONS  Name of Medication? HYDROcodone-acetaminophen (NORCO) 5-325 MG per tablet  Patient-reported dosage and instructions? 5-325 mg  How many days do you have left? 3  Preferred Pharmacy? [id number]  Pharmacy phone number (if available)? [phone]  ---------------------------------------------------------------------------  --------------  CALL BACK INFO  What is the best way for the office to contact you? OK to leave message on   voicemail  Preferred Call Back Phone Number? [phone]  ---------------------------------------------------------------------------  --------------  SCRIPT ANSWERS  Relationship to Patient?  Self

## 2023-01-16 NOTE — TELEPHONE ENCOUNTER
Patient saw Selma Castillo in [facility] on 1/12/23 and was told to call if needing a refill. Please advise.

## 2023-01-17 ENCOUNTER — HOSPITAL ENCOUNTER (OUTPATIENT)
Dept: MRI IMAGING | Age: 63
Discharge: HOME | End: 2023-01-19
Payer: COMMERCIAL

## 2023-01-17 DIAGNOSIS — S22.070A CLOSED WEDGE COMPRESSION FRACTURE OF T10 VERTEBRA, INITIAL ENCOUNTER (HCC): ICD-10-CM

## 2023-01-17 PROCEDURE — 72146 MRI CHEST SPINE W/O DYE: CPT

## 2023-01-18 ENCOUNTER — TELEPHONE (OUTPATIENT)
Dept: PAIN MANAGEMENT | Age: 63
End: 2023-01-18

## 2023-01-18 ENCOUNTER — OFFICE VISIT (OUTPATIENT)
Dept: PAIN MANAGEMENT | Age: 63
End: 2023-01-18
Payer: COMMERCIAL

## 2023-01-18 ENCOUNTER — HOSPITAL ENCOUNTER (OUTPATIENT)
Dept: NON INVASIVE DIAGNOSTICS | Age: 63
Discharge: HOME | End: 2023-01-18
Payer: COMMERCIAL

## 2023-01-18 VITALS
HEIGHT: 64 IN | OXYGEN SATURATION: 99 % | BODY MASS INDEX: 23.9 KG/M2 | RESPIRATION RATE: 17 BRPM | DIASTOLIC BLOOD PRESSURE: 86 MMHG | HEART RATE: 60 BPM | WEIGHT: 140 LBS | SYSTOLIC BLOOD PRESSURE: 128 MMHG

## 2023-01-18 DIAGNOSIS — S22.000A COMPRESSION FRACTURE OF BODY OF THORACIC VERTEBRA (HCC): ICD-10-CM

## 2023-01-18 DIAGNOSIS — M54.6 ACUTE MIDLINE THORACIC BACK PAIN: ICD-10-CM

## 2023-01-18 DIAGNOSIS — S22.000A COMPRESSION FRACTURE OF BODY OF THORACIC VERTEBRA (HCC): Primary | ICD-10-CM

## 2023-01-18 PROCEDURE — 99204 OFFICE O/P NEW MOD 45 MIN: CPT | Performed by: NURSE PRACTITIONER

## 2023-01-18 PROCEDURE — 93005 ELECTROCARDIOGRAM TRACING: CPT

## 2023-01-18 ASSESSMENT — ENCOUNTER SYMPTOMS
GASTROINTESTINAL NEGATIVE: 1
BACK PAIN: 1
EYES NEGATIVE: 1
RESPIRATORY NEGATIVE: 1

## 2023-01-18 NOTE — TELEPHONE ENCOUNTER
OK for ANGI.    Yonas Gage CRNA, MS  Chief Anesthetist  [facility]    
The patient is scheduled for a T10 (and any other level) balloon kyphoplasty  on 1/30/23  please review their chart to assure that are able to have the procedure under sedation. Their last EKG was on 1/18/23.
A&Ox4. ambulatory. c/o sent by PCP for no FHR. pt states she is 8 weeks pregnant and this is her 3rd pregnancy. pt denies miscarriages, abortions, chest pain, dizziness, SOB, n/v/d, urinary symptoms. respirations are even and un labored. abd is non tender upon palpation. skin intact, 20g placed to right ac. labs drawn and sent. safety precautions maintained.

## 2023-01-18 NOTE — PROGRESS NOTES
Subjective:      Patient ID: Ro Bella is a 61 y.o. female. Chief Complaint   Patient presents with    Back Pain     Mid      HPI Initial new patient consult    Thoracic compression fracture, possible candidate for kyphoplasty    Norco prn with relief    Pain Assessment  Location of Pain: Back  Location Modifiers: Medial, Posterior  Severity of Pain: 7  Quality of Pain: Sharp (stabbing)  Duration of Pain: Persistent  Frequency of Pain: Constant  Date Pain First Started: 01/12/23  Aggravating Factors: Bending (laying lifting)  Limiting Behavior: Yes  Relieving Factors: Rest, Ice, Heat (meds)  Result of Injury: Yes  Work-Related Injury: No  Are there other pain locations you wish to document?: No    Allergies   Allergen Reactions    Latex Rash    Codeine Nausea Only and Nausea And Vomiting       Outpatient Medications Marked as Taking for the 1/18/23 encounter (Office Visit) with SILVANA Proctor CNP   Medication Sig Dispense Refill    HYDROcodone-acetaminophen (NORCO) 5-325 MG per tablet Take 1 tablet by mouth every 6 hours as needed for Pain for up to 5 days. Intended supply: 3 days. Take lowest dose possible to manage pain Max Daily Amount: 4 tablets 20 tablet 0    tiZANidine (ZANAFLEX) 4 MG tablet Take 1 tablet by mouth every 6 hours as needed (back spasms) 30 tablet 0    ezetimibe (ZETIA) 10 MG tablet TAKE 1 TABLET DAILY 90 tablet 3    losartan (COZAAR) 25 MG tablet Take 0.5 tablets by mouth 2 times daily 90 tablet 3    IMBRUVICA 140 MG chemo capsule Take 1 capsule by mouth daily       Multiple Vitamins-Minerals (MULTIVITAL PO) Take  by mouth.      calcium carbonate (OSCAL) 500 MG TABS tablet Take 500 mg by mouth daily. Vitamin D (CHOLECALCIFEROL) 1000 UNITS CAPS capsule Take 2,000 Units by mouth daily       aspirin 81 MG tablet Take 81 mg by mouth daily.          Past Medical History:   Diagnosis Date    Allergic rhinitis     Anxiety     Chronic sinusitis     CLL (chronic lymphocytic leukemia) [facility])     (follows with Dr. Anette Falcon, Oncology Dept., [address] [facility]). Depression     Hemorrhoids     (internal and external)    Hyperlipidemia     Kidney stones     history of     Non Hodgkin's lymphoma ([facility] [address]     1998 remission for 10 years currently under [facility] care    Proctitis 2008       Past Surgical History:   Procedure Laterality Date    COLONOSCOPY  02/12/2008    (Dr. Claudia Zazueta) - External and internal hemorrhoids, nonspecific proctitis. (Path: Reactive change, mild). COLONOSCOPY  03/30/2015    internal hemorrhoids [facility] Dr. [facility] ARTHROSCOPY Right 2018    Dr. Raji Rothman - done at [facility]         Family History   Problem Relation Age of Onset    Heart Disease Mother     Coronary Art Dis Sister         (CABG)    Heart Disease Sister     High Blood Pressure Sister     Thyroid Disease Sister     Hypertension Sister     Thyroid Disease Sister     Hypertension Sister     Heart Attack Brother     Coronary Art Dis Brother         (with stent placement)    Coronary Art Dis Brother         (with stent placement)    Heart Attack Brother     Thyroid Disease Brother     Heart Attack Brother     Hypertension Brother     Hypertension Brother     Heart Disease Maternal Grandmother     Heart Disease Maternal Grandfather     Early Death Paternal Grandfather         electrocution    High Cholesterol Daughter        Social History     Socioeconomic History    Marital status:      Spouse name: None    Number of children: None    Years of education: None    Highest education level: None   Tobacco Use    Smoking status: Never    Smokeless tobacco: Never   Vaping Use    Vaping Use: Never used   Substance and Sexual Activity    Alcohol use: Yes     Comment: weekly, champagne, beer    Drug use: No     Review of Systems   Constitutional:  Positive for activity change. HENT: Negative. Eyes: Negative. Respiratory: Negative. Cardiovascular: Negative. Gastrointestinal: Negative. Genitourinary: Negative. Musculoskeletal:  Positive for arthralgias, back pain and myalgias. Skin: Negative. Neurological: Negative. Hematological: Negative. Psychiatric/Behavioral:  Positive for sleep disturbance. Objective:   Physical Exam  Vitals reviewed. Constitutional:       General: She is awake. She is not in acute distress. Appearance: Normal appearance. She is well-developed. She is not ill-appearing, toxic-appearing or diaphoretic. Interventions: She is not intubated. HENT:      Head: Normocephalic and atraumatic. Right Ear: External ear normal.      Left Ear: External ear normal.      Nose: Nose normal.      Mouth/Throat:      Lips: Pink. Mouth: Mucous membranes are moist.   Eyes:      General: Lids are normal.   Cardiovascular:      Rate and Rhythm: Normal rate. Pulmonary:      Effort: Pulmonary effort is normal. No tachypnea, bradypnea, accessory muscle usage, prolonged expiration, respiratory distress or retractions. She is not intubated. Breath sounds: No stridor. No wheezing, rhonchi or rales. Chest:      Chest wall: No tenderness. Abdominal:      Palpations: Abdomen is soft. Musculoskeletal:      Comments: MRI OF THE THORACIC SPINE WITHOUT CONTRAST  1/17/2023 1:27 pm     TECHNIQUE:  Multiplanar multisequence MRI of the thoracic spine was performed without the  administration of intravenous contrast.     COMPARISON:  Thoracic spine radiographs done 01/12/2013. HISTORY:  ORDERING SYSTEM PROVIDED HISTORY: Closed wedge compression fracture of T10  vertebra, initial encounter [facility])  TECHNOLOGIST PROVIDED HISTORY:  fall, landing on buttocks, pain to mid back, T10 compression fracture seen on  xray imaging  What is the sedation requirement?->None  Reason for Exam: Patient fell down the steps on 1/12/2023, landing on  buttocks.  Mid to lower thoracic spine pain, x-ray showed compression fracture  T10. Additional signs and symptoms: Closed wedge compression fracture of T10  vertebra, initial encounter     FINDINGS:  BONES/ALIGNMENT: Acute/subacute T10 vertebral body superior endplate  compression fracture with 30% height loss anteriorly and marrow edema  throughout the vertebral body which extends into the bilateral pedicles. No  significant posterior fracture retropulsion. No epidural hematoma or acute  ligamentous injury. No other acute or subacute vertebral body compression fracture. Normal  thoracic kyphosis. No significant listhesis. No marrow replacing process. No foci of suspicious bone marrow edema. SPINAL CORD: No abnormal cord signal is seen. SOFT TISSUES: No paraspinal mass identified. Mild anterior perivertebral  fluid/hemorrhage at the T9-T11 levels. DEGENERATIVE CHANGES: Mild multilevel degenerative disc desiccation and  height loss. Multilevel marginal osteophytes. Mild anterior T6-T7  degenerative endplate changes with patchy discogenic edema. No significant  spinal canal stenosis or neural foraminal narrowing of the thoracic spine. Impression  1. Acute/subacute T10 superior endplate compression fracture with 30% height  loss anteriorly. Marrow edema extends into the bilateral pedicles. No  significant posterior fracture retropulsion. 2.  No epidural hematoma or acute ligamentous injury. 3.  No significant thoracic spinal canal or neural foraminal narrowing. Skin:     General: Skin is warm and dry. Capillary Refill: Capillary refill takes less than 2 seconds. Coloration: Skin is not ashen, jaundiced or pale. Findings: No rash. Nails: There is no clubbing. Neurological:      Mental Status: She is alert and oriented to person, place, and time. GCS: GCS eye subscore is 4. GCS verbal subscore is 5. GCS motor subscore is 6. Cranial Nerves: No cranial nerve deficit.    Psychiatric:         Attention and Perception: Attention and perception normal.         Mood and Affect: Mood and affect normal.         Speech: Speech normal.         Behavior: Behavior is cooperative.         Cognition and Memory: Cognition normal.         Judgment: Judgment normal.       Assessment / Plan:       Diagnosis Orders   1. Compression fracture of body of thoracic vertebra (HCC)  EKG 12 lead      2. Acute midline thoracic back pain            Thoracic MRI reviewed, will schedule T10 kyphoplasty

## 2023-01-18 NOTE — H&P (VIEW-ONLY)
Subjective:      Patient ID: Carlie Vick is a 61 y.o. female. Chief Complaint   Patient presents with    Back Pain     Mid      HPI Initial new patient consult    Thoracic compression fracture, possible candidate for kyphoplasty    Norco prn with relief    Pain Assessment  Location of Pain: Back  Location Modifiers: Medial, Posterior  Severity of Pain: 7  Quality of Pain: Sharp (stabbing)  Duration of Pain: Persistent  Frequency of Pain: Constant  Date Pain First Started: 01/12/23  Aggravating Factors: Bending (laying lifting)  Limiting Behavior: Yes  Relieving Factors: Rest, Ice, Heat (meds)  Result of Injury: Yes  Work-Related Injury: No  Are there other pain locations you wish to document?: No    Allergies   Allergen Reactions    Latex Rash    Codeine Nausea Only and Nausea And Vomiting       Outpatient Medications Marked as Taking for the 1/18/23 encounter (Office Visit) with SILVANA Serna CNP   Medication Sig Dispense Refill    HYDROcodone-acetaminophen (NORCO) 5-325 MG per tablet Take 1 tablet by mouth every 6 hours as needed for Pain for up to 5 days. Intended supply: 3 days. Take lowest dose possible to manage pain Max Daily Amount: 4 tablets 20 tablet 0    tiZANidine (ZANAFLEX) 4 MG tablet Take 1 tablet by mouth every 6 hours as needed (back spasms) 30 tablet 0    ezetimibe (ZETIA) 10 MG tablet TAKE 1 TABLET DAILY 90 tablet 3    losartan (COZAAR) 25 MG tablet Take 0.5 tablets by mouth 2 times daily 90 tablet 3    IMBRUVICA 140 MG chemo capsule Take 1 capsule by mouth daily       Multiple Vitamins-Minerals (MULTIVITAL PO) Take  by mouth.      calcium carbonate (OSCAL) 500 MG TABS tablet Take 500 mg by mouth daily. Vitamin D (CHOLECALCIFEROL) 1000 UNITS CAPS capsule Take 2,000 Units by mouth daily       aspirin 81 MG tablet Take 81 mg by mouth daily.          Past Medical History:   Diagnosis Date    Allergic rhinitis     Anxiety     Chronic sinusitis     CLL (chronic lymphocytic leukemia) [facility])     (follows with Dr. Lucille Vila, Oncology Dept., [address] [facility]). Depression     Hemorrhoids     (internal and external)    Hyperlipidemia     Kidney stones     history of     Non Hodgkin's lymphoma ([address]     1998 remission for 10 years currently under [facility] care    Proctitis 2008       Past Surgical History:   Procedure Laterality Date    COLONOSCOPY  02/12/2008    (Dr. Aby Slaughter) - External and internal hemorrhoids, nonspecific proctitis. (Path: Reactive change, mild). COLONOSCOPY  03/30/2015    internal hemorrhoids [facility] Dr. Kathleen Flanagan ARTHROSCOPY Right 2018    Dr. Emerson Foot - done at [facility]         Family History   Problem Relation Age of Onset    Heart Disease Mother     Coronary Art Dis Sister         (CABG)    Heart Disease Sister     High Blood Pressure Sister     Thyroid Disease Sister     Hypertension Sister     Thyroid Disease Sister     Hypertension Sister     Heart Attack Brother     Coronary Art Dis Brother         (with stent placement)    Coronary Art Dis Brother         (with stent placement)    Heart Attack Brother     Thyroid Disease Brother     Heart Attack Brother     Hypertension Brother     Hypertension Brother     Heart Disease Maternal Grandmother     Heart Disease Maternal Grandfather     Early Death Paternal Grandfather         electrocution    High Cholesterol Daughter        Social History     Socioeconomic History    Marital status:      Spouse name: None    Number of children: None    Years of education: None    Highest education level: None   Tobacco Use    Smoking status: Never    Smokeless tobacco: Never   Vaping Use    Vaping Use: Never used   Substance and Sexual Activity    Alcohol use: Yes     Comment: weekly, champagne, beer    Drug use: No     Review of Systems   Constitutional:  Positive for activity change. HENT: Negative. Eyes: Negative. Respiratory: Negative. Cardiovascular: Negative. Gastrointestinal: Negative. Genitourinary: Negative. Musculoskeletal:  Positive for arthralgias, back pain and myalgias. Skin: Negative. Neurological: Negative. Hematological: Negative. Psychiatric/Behavioral:  Positive for sleep disturbance. Objective:   Physical Exam  Vitals reviewed. Constitutional:       General: She is awake. She is not in acute distress. Appearance: Normal appearance. She is well-developed. She is not ill-appearing, toxic-appearing or diaphoretic. Interventions: She is not intubated. HENT:      Head: Normocephalic and atraumatic. Right Ear: External ear normal.      Left Ear: External ear normal.      Nose: Nose normal.      Mouth/Throat:      Lips: Pink. Mouth: Mucous membranes are moist.   Eyes:      General: Lids are normal.   Cardiovascular:      Rate and Rhythm: Normal rate. Pulmonary:      Effort: Pulmonary effort is normal. No tachypnea, bradypnea, accessory muscle usage, prolonged expiration, respiratory distress or retractions. She is not intubated. Breath sounds: No stridor. No wheezing, rhonchi or rales. Chest:      Chest wall: No tenderness. Abdominal:      Palpations: Abdomen is soft. Musculoskeletal:      Comments: MRI OF THE THORACIC SPINE WITHOUT CONTRAST  1/17/2023 1:27 pm     TECHNIQUE:  Multiplanar multisequence MRI of the thoracic spine was performed without the  administration of intravenous contrast.     COMPARISON:  Thoracic spine radiographs done 01/12/2013. HISTORY:  ORDERING SYSTEM PROVIDED HISTORY: Closed wedge compression fracture of T10  vertebra, initial encounter [facility])  TECHNOLOGIST PROVIDED HISTORY:  fall, landing on buttocks, pain to mid back, T10 compression fracture seen on  xray imaging  What is the sedation requirement?->None  Reason for Exam: Patient fell down the steps on 1/12/2023, landing on  buttocks.  Mid to lower thoracic spine pain, x-ray showed compression fracture  T10. Additional signs and symptoms: Closed wedge compression fracture of T10  vertebra, initial encounter     FINDINGS:  BONES/ALIGNMENT: Acute/subacute T10 vertebral body superior endplate  compression fracture with 30% height loss anteriorly and marrow edema  throughout the vertebral body which extends into the bilateral pedicles. No  significant posterior fracture retropulsion. No epidural hematoma or acute  ligamentous injury. No other acute or subacute vertebral body compression fracture. Normal  thoracic kyphosis. No significant listhesis. No marrow replacing process. No foci of suspicious bone marrow edema. SPINAL CORD: No abnormal cord signal is seen. SOFT TISSUES: No paraspinal mass identified. Mild anterior perivertebral  fluid/hemorrhage at the T9-T11 levels. DEGENERATIVE CHANGES: Mild multilevel degenerative disc desiccation and  height loss. Multilevel marginal osteophytes. Mild anterior T6-T7  degenerative endplate changes with patchy discogenic edema. No significant  spinal canal stenosis or neural foraminal narrowing of the thoracic spine. Impression  1. Acute/subacute T10 superior endplate compression fracture with 30% height  loss anteriorly. Marrow edema extends into the bilateral pedicles. No  significant posterior fracture retropulsion. 2.  No epidural hematoma or acute ligamentous injury. 3.  No significant thoracic spinal canal or neural foraminal narrowing. Skin:     General: Skin is warm and dry. Capillary Refill: Capillary refill takes less than 2 seconds. Coloration: Skin is not ashen, jaundiced or pale. Findings: No rash. Nails: There is no clubbing. Neurological:      Mental Status: She is alert and oriented to person, place, and time. GCS: GCS eye subscore is 4. GCS verbal subscore is 5. GCS motor subscore is 6. Cranial Nerves: No cranial nerve deficit.    Psychiatric:         Attention and Perception: Attention and perception normal.         Mood and Affect: Mood and affect normal.         Speech: Speech normal.         Behavior: Behavior is cooperative. Cognition and Memory: Cognition normal.         Judgment: Judgment normal.       Assessment / Plan:       Diagnosis Orders   1. Compression fracture of body of thoracic vertebra (HCC)  EKG 12 lead      2.  Acute midline thoracic back pain            Thoracic MRI reviewed, will schedule T10 kyphoplasty

## 2023-01-18 NOTE — TELEPHONE ENCOUNTER
1/18/23                                                                                                                                                                           To: Tricia Ramires DO   [address] / [place] [address]       From:  Eleanor Garrett MD   Interventional Pain Mgmt Physician, [facility]    Re: Medication Hiatus for Interventional Pain/Spine Procedure     Dear Tricia Ramires DO                   I am recommending an injection for Dalton Nunez 1960 to decrease their spine related pain. Antiplatelet/anticoagulant medications will need to be held prior to the procedure. He/She is on Aspirin 81 mg      Medications that must be held prior to injections:     Antiplatelets:  Aspirin, Effient, Plavix, Ticlid, Brilinta, or Savaysa for 3-5 days  Anticoagulant:  Coumadin, Eliquis, Lovenox, Heparin, Pradaxa, or Xarelto for 5-7 days. Please fax your response to [phone] with one of the options below. Call [phone] if any questions. Sincerely,       Electronically signed by Dar Chi LPN on 6/82/1001 at 23:96 AM    Eleanor Garrett MD       I agree with holding this medication for the time described above.           Signature____Karin Ona Crigler, DO__________ Dywk___[phone]_____    Printed Name_____Karin Ona Crigler, DO_____

## 2023-01-19 LAB
EKG ATRIAL RATE: 52 BPM
EKG P AXIS: 65 DEGREES
EKG P-R INTERVAL: 184 MS
EKG Q-T INTERVAL: 396 MS
EKG QRS DURATION: 90 MS
EKG QTC CALCULATION (BAZETT): 368 MS
EKG R AXIS: 75 DEGREES
EKG T AXIS: 63 DEGREES
EKG VENTRICULAR RATE: 52 BPM

## 2023-01-24 DIAGNOSIS — M54.50 LUMBAR BACK PAIN: ICD-10-CM

## 2023-01-24 DIAGNOSIS — M54.6 ACUTE BILATERAL THORACIC BACK PAIN: ICD-10-CM

## 2023-01-24 RX ORDER — TIZANIDINE 4 MG/1
4 TABLET ORAL EVERY 6 HOURS PRN
Qty: 30 TABLET | Refills: 11 | Status: SHIPPED | OUTPATIENT
Start: 2023-01-24 | End: 2023-01-26 | Stop reason: SDUPTHER

## 2023-01-24 RX ORDER — HYDROCODONE BITARTRATE AND ACETAMINOPHEN 5; 325 MG/1; MG/1
1 TABLET ORAL EVERY 6 HOURS PRN
Qty: 20 TABLET | Refills: 0 | Status: SHIPPED | OUTPATIENT
Start: 2023-01-24 | End: 2023-01-26 | Stop reason: SDUPTHER

## 2023-01-24 NOTE — TELEPHONE ENCOUNTER
Hard stop pinned for your review. Patient has an upcoming kypho scheduled for 1/30/23. Patient called today requesting a refill on norco and zanaflex. This office has never prescribed medication. If this office does want to prescribe medications are pinned for your convenience. No DS11 on file. .pharmacy = [facility]    [place] 5/325 mg and tizanidine 4 mg . [place], and [place]:  norco 5/325 mg  1/16/23   #20  . Due now.

## 2023-01-26 DIAGNOSIS — M54.50 LUMBAR BACK PAIN: ICD-10-CM

## 2023-01-26 DIAGNOSIS — M54.6 ACUTE BILATERAL THORACIC BACK PAIN: ICD-10-CM

## 2023-01-26 RX ORDER — HYDROCODONE BITARTRATE AND ACETAMINOPHEN 5; 325 MG/1; MG/1
1 TABLET ORAL EVERY 6 HOURS PRN
Qty: 20 TABLET | Refills: 0 | Status: SHIPPED | OUTPATIENT
Start: 2023-01-26 | End: 2023-01-31

## 2023-01-26 RX ORDER — TIZANIDINE 4 MG/1
4 TABLET ORAL EVERY 6 HOURS PRN
Qty: 30 TABLET | Refills: 11 | Status: SHIPPED | OUTPATIENT
Start: 2023-01-26

## 2023-01-26 NOTE — TELEPHONE ENCOUNTER
Patient called refill line for their   Norco 5-325 mg   Zanaflex 4 mg  [place]     Last Appt:  1/18/2023  Next Appt:   Visit date not found  Med verified in [address]: nothing on file       [place] for [place], and [place]:   Norco 5-325 mg   Zanaflex 4 mg  Last filled 1/16/23  Due 1/26/23

## 2023-01-30 ENCOUNTER — APPOINTMENT (OUTPATIENT)
Dept: GENERAL RADIOLOGY | Age: 63
End: 2023-01-30
Attending: PHYSICAL MEDICINE & REHABILITATION
Payer: COMMERCIAL

## 2023-01-30 ENCOUNTER — ANESTHESIA (OUTPATIENT)
Dept: OPERATING ROOM | Age: 63
End: 2023-01-30
Payer: COMMERCIAL

## 2023-01-30 ENCOUNTER — HOSPITAL ENCOUNTER (OUTPATIENT)
Age: 63
Setting detail: OUTPATIENT SURGERY
Discharge: HOME | End: 2023-01-30
Attending: PHYSICAL MEDICINE & REHABILITATION | Admitting: PHYSICAL MEDICINE & REHABILITATION
Payer: COMMERCIAL

## 2023-01-30 ENCOUNTER — ANESTHESIA EVENT (OUTPATIENT)
Dept: OPERATING ROOM | Age: 63
End: 2023-01-30
Payer: COMMERCIAL

## 2023-01-30 VITALS
BODY MASS INDEX: 23.18 KG/M2 | OXYGEN SATURATION: 97 % | TEMPERATURE: 97.1 F | HEART RATE: 78 BPM | HEIGHT: 64 IN | DIASTOLIC BLOOD PRESSURE: 58 MMHG | RESPIRATION RATE: 16 BRPM | SYSTOLIC BLOOD PRESSURE: 132 MMHG | WEIGHT: 135.8 LBS

## 2023-01-30 DIAGNOSIS — S22.070A COMPRESSION FRACTURE OF T10 VERTEBRA, INITIAL ENCOUNTER (HCC): ICD-10-CM

## 2023-01-30 PROBLEM — M51.34 THORACIC DEGENERATIVE DISC DISEASE: Status: ACTIVE | Noted: 2023-01-30

## 2023-01-30 PROCEDURE — C1713 ANCHOR/SCREW BN/BN,TIS/BN: HCPCS | Performed by: PHYSICAL MEDICINE & REHABILITATION

## 2023-01-30 PROCEDURE — 2500000003 HC RX 250 WO HCPCS: Performed by: NURSE ANESTHETIST, CERTIFIED REGISTERED

## 2023-01-30 PROCEDURE — 3600000003 HC SURGERY LEVEL 3 BASE: Performed by: PHYSICAL MEDICINE & REHABILITATION

## 2023-01-30 PROCEDURE — 2500000003 HC RX 250 WO HCPCS: Performed by: PHYSICAL MEDICINE & REHABILITATION

## 2023-01-30 PROCEDURE — 6360000002 HC RX W HCPCS: Performed by: NURSE ANESTHETIST, CERTIFIED REGISTERED

## 2023-01-30 PROCEDURE — 3700000001 HC ADD 15 MINUTES (ANESTHESIA): Performed by: PHYSICAL MEDICINE & REHABILITATION

## 2023-01-30 PROCEDURE — 2709999900 HC NON-CHARGEABLE SUPPLY: Performed by: PHYSICAL MEDICINE & REHABILITATION

## 2023-01-30 PROCEDURE — 2720000010 HC SURG SUPPLY STERILE: Performed by: PHYSICAL MEDICINE & REHABILITATION

## 2023-01-30 PROCEDURE — 88311 DECALCIFY TISSUE: CPT

## 2023-01-30 PROCEDURE — 2580000003 HC RX 258: Performed by: PHYSICAL MEDICINE & REHABILITATION

## 2023-01-30 PROCEDURE — 7100000010 HC PHASE II RECOVERY - FIRST 15 MIN: Performed by: PHYSICAL MEDICINE & REHABILITATION

## 2023-01-30 PROCEDURE — 88341 IMHCHEM/IMCYTCHM EA ADD ANTB: CPT

## 2023-01-30 PROCEDURE — 3209999900 FLUORO FOR SURGICAL PROCEDURES

## 2023-01-30 PROCEDURE — 7100000000 HC PACU RECOVERY - FIRST 15 MIN: Performed by: PHYSICAL MEDICINE & REHABILITATION

## 2023-01-30 PROCEDURE — 6360000002 HC RX W HCPCS: Performed by: PHYSICAL MEDICINE & REHABILITATION

## 2023-01-30 PROCEDURE — 3700000000 HC ANESTHESIA ATTENDED CARE: Performed by: PHYSICAL MEDICINE & REHABILITATION

## 2023-01-30 PROCEDURE — 7100000001 HC PACU RECOVERY - ADDTL 15 MIN: Performed by: PHYSICAL MEDICINE & REHABILITATION

## 2023-01-30 PROCEDURE — 3600000013 HC SURGERY LEVEL 3 ADDTL 15MIN: Performed by: PHYSICAL MEDICINE & REHABILITATION

## 2023-01-30 PROCEDURE — 7100000011 HC PHASE II RECOVERY - ADDTL 15 MIN: Performed by: PHYSICAL MEDICINE & REHABILITATION

## 2023-01-30 PROCEDURE — 88307 TISSUE EXAM BY PATHOLOGIST: CPT

## 2023-01-30 PROCEDURE — 88342 IMHCHEM/IMCYTCHM 1ST ANTB: CPT

## 2023-01-30 PROCEDURE — 6360000004 HC RX CONTRAST MEDICATION: Performed by: PHYSICAL MEDICINE & REHABILITATION

## 2023-01-30 PROCEDURE — 22513 PERQ VERTEBRAL AUGMENTATION: CPT | Performed by: PHYSICAL MEDICINE & REHABILITATION

## 2023-01-30 DEVICE — BONE CEMENT CX01A XPEDE US
Type: IMPLANTABLE DEVICE | Site: BACK | Status: FUNCTIONAL
Brand: KYPHON® XPEDE™ BONE CEMENT

## 2023-01-30 RX ORDER — ONDANSETRON 2 MG/ML
INJECTION INTRAMUSCULAR; INTRAVENOUS PRN
Status: DISCONTINUED | OUTPATIENT
Start: 2023-01-30 | End: 2023-01-30 | Stop reason: SDUPTHER

## 2023-01-30 RX ORDER — SODIUM CHLORIDE 0.9 % (FLUSH) 0.9 %
5-40 SYRINGE (ML) INJECTION EVERY 12 HOURS SCHEDULED
Status: DISCONTINUED | OUTPATIENT
Start: 2023-01-30 | End: 2023-01-30 | Stop reason: HOSPADM

## 2023-01-30 RX ORDER — FENTANYL CITRATE 50 UG/ML
25 INJECTION, SOLUTION INTRAMUSCULAR; INTRAVENOUS EVERY 5 MIN PRN
Status: DISCONTINUED | OUTPATIENT
Start: 2023-01-30 | End: 2023-01-30 | Stop reason: HOSPADM

## 2023-01-30 RX ORDER — DEXAMETHASONE SODIUM PHOSPHATE 10 MG/ML
INJECTION INTRAMUSCULAR; INTRAVENOUS PRN
Status: DISCONTINUED | OUTPATIENT
Start: 2023-01-30 | End: 2023-01-30 | Stop reason: SDUPTHER

## 2023-01-30 RX ORDER — SODIUM CHLORIDE 0.9 % (FLUSH) 0.9 %
5-40 SYRINGE (ML) INJECTION PRN
Status: DISCONTINUED | OUTPATIENT
Start: 2023-01-30 | End: 2023-01-30 | Stop reason: HOSPADM

## 2023-01-30 RX ORDER — ROCURONIUM BROMIDE 10 MG/ML
INJECTION, SOLUTION INTRAVENOUS PRN
Status: DISCONTINUED | OUTPATIENT
Start: 2023-01-30 | End: 2023-01-30 | Stop reason: SDUPTHER

## 2023-01-30 RX ORDER — FENTANYL CITRATE 50 UG/ML
INJECTION, SOLUTION INTRAMUSCULAR; INTRAVENOUS PRN
Status: DISCONTINUED | OUTPATIENT
Start: 2023-01-30 | End: 2023-01-30 | Stop reason: SDUPTHER

## 2023-01-30 RX ORDER — SODIUM CHLORIDE, SODIUM LACTATE, POTASSIUM CHLORIDE, CALCIUM CHLORIDE 600; 310; 30; 20 MG/100ML; MG/100ML; MG/100ML; MG/100ML
INJECTION, SOLUTION INTRAVENOUS CONTINUOUS
Status: DISCONTINUED | OUTPATIENT
Start: 2023-01-30 | End: 2023-01-30 | Stop reason: HOSPADM

## 2023-01-30 RX ORDER — ONDANSETRON 2 MG/ML
4 INJECTION INTRAMUSCULAR; INTRAVENOUS
Status: DISCONTINUED | OUTPATIENT
Start: 2023-01-30 | End: 2023-01-30 | Stop reason: HOSPADM

## 2023-01-30 RX ORDER — MIDAZOLAM HYDROCHLORIDE 1 MG/ML
INJECTION INTRAMUSCULAR; INTRAVENOUS PRN
Status: DISCONTINUED | OUTPATIENT
Start: 2023-01-30 | End: 2023-01-30 | Stop reason: SDUPTHER

## 2023-01-30 RX ORDER — LIDOCAINE HYDROCHLORIDE 20 MG/ML
INJECTION, SOLUTION EPIDURAL; INFILTRATION; INTRACAUDAL; PERINEURAL PRN
Status: DISCONTINUED | OUTPATIENT
Start: 2023-01-30 | End: 2023-01-30 | Stop reason: SDUPTHER

## 2023-01-30 RX ORDER — SODIUM CHLORIDE 9 MG/ML
INJECTION, SOLUTION INTRAVENOUS PRN
Status: DISCONTINUED | OUTPATIENT
Start: 2023-01-30 | End: 2023-01-30 | Stop reason: HOSPADM

## 2023-01-30 RX ORDER — PROPOFOL 10 MG/ML
INJECTION, EMULSION INTRAVENOUS PRN
Status: DISCONTINUED | OUTPATIENT
Start: 2023-01-30 | End: 2023-01-30 | Stop reason: SDUPTHER

## 2023-01-30 RX ADMIN — PHENYLEPHRINE HYDROCHLORIDE 100 MCG: 10 INJECTION INTRAVENOUS at 12:20

## 2023-01-30 RX ADMIN — FENTANYL CITRATE 50 MCG: 50 INJECTION, SOLUTION INTRAMUSCULAR; INTRAVENOUS at 11:25

## 2023-01-30 RX ADMIN — PHENYLEPHRINE HYDROCHLORIDE 100 MCG: 10 INJECTION INTRAVENOUS at 12:05

## 2023-01-30 RX ADMIN — Medication 2000 MG: at 11:34

## 2023-01-30 RX ADMIN — DEXAMETHASONE SODIUM PHOSPHATE 10 MG: 10 INJECTION INTRAMUSCULAR; INTRAVENOUS at 11:32

## 2023-01-30 RX ADMIN — PHENYLEPHRINE HYDROCHLORIDE 100 MCG: 10 INJECTION INTRAVENOUS at 12:15

## 2023-01-30 RX ADMIN — PHENYLEPHRINE HYDROCHLORIDE 100 MCG: 10 INJECTION INTRAVENOUS at 11:51

## 2023-01-30 RX ADMIN — MIDAZOLAM HYDROCHLORIDE 2 MG: 1 INJECTION, SOLUTION INTRAMUSCULAR; INTRAVENOUS at 11:25

## 2023-01-30 RX ADMIN — LIDOCAINE HYDROCHLORIDE 60 MG: 20 INJECTION, SOLUTION EPIDURAL; INFILTRATION; INTRACAUDAL; PERINEURAL at 11:27

## 2023-01-30 RX ADMIN — PHENYLEPHRINE HYDROCHLORIDE 100 MCG: 10 INJECTION INTRAVENOUS at 11:50

## 2023-01-30 RX ADMIN — PHENYLEPHRINE HYDROCHLORIDE 100 MCG: 10 INJECTION INTRAVENOUS at 12:01

## 2023-01-30 RX ADMIN — PHENYLEPHRINE HYDROCHLORIDE 100 MCG: 10 INJECTION INTRAVENOUS at 11:42

## 2023-01-30 RX ADMIN — SODIUM CHLORIDE, POTASSIUM CHLORIDE, SODIUM LACTATE AND CALCIUM CHLORIDE: 600; 310; 30; 20 INJECTION, SOLUTION INTRAVENOUS at 11:17

## 2023-01-30 RX ADMIN — SUGAMMADEX 100 MG: 100 INJECTION, SOLUTION INTRAVENOUS at 12:21

## 2023-01-30 RX ADMIN — SUGAMMADEX 50 MG: 100 INJECTION, SOLUTION INTRAVENOUS at 12:20

## 2023-01-30 RX ADMIN — ONDANSETRON 4 MG: 2 INJECTION INTRAMUSCULAR; INTRAVENOUS at 11:25

## 2023-01-30 RX ADMIN — ROCURONIUM BROMIDE 50 MG: 10 INJECTION, SOLUTION INTRAVENOUS at 11:27

## 2023-01-30 RX ADMIN — SUGAMMADEX 50 MG: 100 INJECTION, SOLUTION INTRAVENOUS at 12:17

## 2023-01-30 RX ADMIN — PROPOFOL 150 MG: 10 INJECTION, EMULSION INTRAVENOUS at 11:27

## 2023-01-30 ASSESSMENT — PAIN DESCRIPTION - DESCRIPTORS: DESCRIPTORS: STABBING

## 2023-01-30 ASSESSMENT — ENCOUNTER SYMPTOMS
EYES NEGATIVE: 1
RESPIRATORY NEGATIVE: 1
BACK PAIN: 1
GASTROINTESTINAL NEGATIVE: 1

## 2023-01-30 ASSESSMENT — PAIN SCALES - GENERAL
PAINLEVEL_OUTOF10: 0

## 2023-01-30 ASSESSMENT — PAIN - FUNCTIONAL ASSESSMENT: PAIN_FUNCTIONAL_ASSESSMENT: 0-10

## 2023-01-30 NOTE — ANESTHESIA PRE PROCEDURE
Department of Anesthesiology  Preprocedure Note       Name:  Duong Dooley   Age:  61 y.o.  :  1960                                          MRN:  [id number]         Date:  2023      Surgeon: Walker Peng):  Amrit Lazo MD    Procedure: Procedure(s):  T10 KYPHOPLASTY and any other levels as needed    Medications prior to admission:   Prior to Admission medications    Medication Sig Start Date End Date Taking? Authorizing Provider   HYDROcodone-acetaminophen (NORCO) 5-325 MG per tablet Take 1 tablet by mouth every 6 hours as needed for Pain for up to 5 days. Intended supply: 3 days. Take lowest dose possible to manage pain Max Daily Amount: 4 tablets 23  SILVANA Peters CNP   tiZANidine (ZANAFLEX) 4 MG tablet Take 1 tablet by mouth every 6 hours as needed (back spasms) 23   SILVANA Peters CNP   ezetimibe (ZETIA) 10 MG tablet TAKE 1 TABLET DAILY 22   Dorota Shaffer DO   losartan (COZAAR) 25 MG tablet Take 0.5 tablets by mouth 2 times daily 22   Sushil Reyes MD   IMBRUVICA 140 MG chemo capsule Take 1 capsule by mouth daily  17   Historical Provider, MD   Multiple Vitamins-Minerals (MULTIVITAL PO) Take  by mouth. Historical Provider, MD   calcium carbonate (OSCAL) 500 MG TABS tablet Take 500 mg by mouth daily. Historical Provider, MD   Vitamin D (CHOLECALCIFEROL) 1000 UNITS CAPS capsule Take 2,000 Units by mouth daily     Historical Provider, MD   aspirin 81 MG tablet Take 81 mg by mouth daily. Historical Provider, MD       Current medications:    No current facility-administered medications for this encounter. Allergies:     Allergies   Allergen Reactions    Latex Rash    Codeine Nausea Only and Nausea And Vomiting       Problem List:    Patient Active Problem List   Diagnosis Code    Chronic lymphocytic leukemia (HCC) C91.10    Anxiety F41.9    Non Hodgkin's [address] ([facility] C85.90    Kidney stones N20.0    Allergic rhinitis J30.9  Chronic sinusitis J32.9    Other hyperlipidemia E78.49    Peripheral tear of medial meniscus of right knee as current injury S83.221A    Squamous cell skin cancer C44.92       Past Medical History:        Diagnosis Date    Allergic rhinitis     Anxiety     Chronic sinusitis     CLL (ch[address] lymphocytic leukemia) ([facility] [address]     (follows with Dr. Nahun Tiwari, Oncology Dept., [address] [facility]).  Depression     Hemorrhoids     (internal and external)    Hyperlipidemia     Kidney stones     history of     Non Hodgkin's lymphoma ([facility] [address] [facility]     1998 remission for 10 years currently under [facility] care    Proctitis 2008       Past Surgical History:        Procedure Laterality Date    COLONOSCOPY  02/12/2008    (Dr. Xena Young) - External and internal hemorrhoids, nonspecific proctitis. (Path: Reactive change, mild).  COLONOSCOPY  03/30/2015    internal hemorrhoids [facility] Dr. Lucy Perdue Right 2018    Dr. Robert Good - done at [address] [facility]         Social History:    Social History     Tobacco Use    Smoking status: Never    Smokeless tobacco: Never   Substance Use Topics    Alcohol use: Yes     Comment: weekly, champagne, beer                                Counseling given: Not Answered      Vital Signs (Current): There were no vitals filed for this visit.                                            BP Readings from Last 3 Encounters:   01/18/23 128/86   01/12/23 (!) 142/94   06/22/22 124/80       NPO Status:                                                                                 BMI:   Wt Readings from Last 3 Encounters:   01/18/23 140 lb (63.5 kg)   01/17/23 148 lb (67.1 kg)   01/12/23 148 lb (67.1 kg)     There is no height or weight on file to calculate BMI.    CBC:   Lab Results   Component Value Date/Time    WBC 5.5 05/17/2022 02:33 PM    RBC 4.38 05/17/2022 02:33 PM    HGB 13.5 05/17/2022 02:33 PM    HCT 40.5 05/17/2022 02:33 PM    MCV 92.5 05/17/2022 02:33 PM    RDW 13.7 05/17/2022 02:33 PM     05/17/2022 02:33 PM       CMP:   Lab Results   Component Value Date/Time     06/14/2022 11:58 AM    K 4.8 06/14/2022 11:58 AM     06/14/2022 11:58 AM    CO2 28 06/14/2022 11:58 AM    BUN 11 06/14/2022 11:58 AM    CREATININE 0.57 06/14/2022 11:58 AM    GFRAA >60 06/14/2022 11:58 AM    LABGLOM >60 06/14/2022 11:58 AM    GLUCOSE 96 06/14/2022 11:58 AM    PROT 6.6 06/14/2022 11:58 AM    CALCIUM 9.8 06/14/2022 11:58 AM    BILITOT 0.57 06/14/2022 11:58 AM    ALKPHOS 74 06/14/2022 11:58 AM    AST 20 06/14/2022 11:58 AM    ALT 23 06/14/2022 11:58 AM       POC Tests: No results for input(s): POCGLU, POCNA, POCK, POCCL, POCBUN, POCHEMO, POCHCT in the last 72 hours. Coags: No results found for: PROTIME, INR, APTT    HCG (If Applicable): No results found for: PREGTESTUR, PREGSERUM, HCG, HCGQUANT     ABGs: No results found for: PHART, PO2ART, ZLG6EJU, JLB4RAW, BEART, W3ZIYSNH     Type & Screen (If Applicable):  No results found for: LABABO, LABRH    Drug/Infectious Status (If Applicable):  No results found for: HIV, HEPCAB    COVID-19 Screening (If Applicable): No results found for: COVID19        Anesthesia Evaluation  Patient summary reviewed and Nursing notes reviewed no history of anesthetic complications:   Airway: Mallampati: II  TM distance: >3 FB   Neck ROM: full  Mouth opening: > = 3 FB   Dental:          Pulmonary:Negative Pulmonary ROS and normal exam                               Cardiovascular:                      Neuro/Psych:   (+) psychiatric history: stable with treatmentdepression/anxiety             GI/Hepatic/Renal:             Endo/Other:    (+) malignancy/cancer (CLL, Hx non-Hodgkin's Lymphoma). Abdominal:             Vascular: Other Findings:           Anesthesia Plan      general     ASA 3       Induction: intravenous. Anesthetic plan and risks discussed with patient.     Use of blood products discussed with patient whom.    Plan discussed with surgical team.                    SILVANA Doherty - CRNA   1/30/2023

## 2023-01-30 NOTE — DISCHARGE INSTRUCTIONS
Home Care after Thoracic or Lumbar Kyphoplasty    The doctor has done a kyphoplasty at level: ____T10_______. You can expect to have:  Soreness in your back where the needles were placed. A stiff back  Pain when you stand or sit for a long time or when you bend over. These symptoms should go away within the first week. Take all your medications as you have been doing. If your back pain is the same or better, you can return to your normal routine. If your back feels worse, you should:  Rest your back for the next few days  Put ice on the site for 20 minutes on and 20 minutes off as needed. Take your pain medicines as instructed. You may remove your bandage in 3 days. You may leave your incision open to air if there is no drainage. Apply a new bandage if needed. You may shower 3 days after your surgery. No soaking in bath tub, hot tub or swimming for 1 week after your surgery. You may resume taking your routine medicines after the procedure including pain medicines as prescribed. Resume any medications held for the procedure (blood thinners, aspirin, anti-inflammatories). If you do not already have a follow-up appointment, call your referring doctors office to make one to discuss your results within 2-4 weeks after the treatment. Your doctor will have the report within 7-10 days. You will be given a pain log to complete for the next 14 days. Complete this form and make a copy for your own records. Then, mail it back to us or drop it off at the pain management clinic. We will need this information to decide the next step in your treatment plan. Signs of infection  Fever greater than 100.4°F by mouth for 2 readings taken 4 hours apart  Increased redness, swelling around the site  Any drainage from the site       If you have any new symptoms or any signs of infection, please call [phone] during business hours to notify us. You can also notify your primary care physician.   After hours, nights and weekends, call [phone]. ACTIVITY:  You have received sedation. Your judgement and coordination may be impaired. Do not drive or operate any machinery today,   Do not make personal, medical, legal, or business decisions,   Do not consume alcohol, tranquilizers, sleeping or non-prescription medication today, unless indicated by your physicians. You may resume normal activities after 24 hours.

## 2023-01-30 NOTE — INTERVAL H&P NOTE
I have interviewed and examined the patient and reviewed the recent History and Physical.  There have been no changes to the recent H&P documentation. The surgical consent form has been signed. Last anticoagulant medication use was:-    Premedication taken for contrast allergy? No    Valium taken for oral sedation? No    No outpatient medications have been marked as taking for the 1/30/23 encounter [facility] Encounter). The patient understands the planned operation and its associated risks and benefits and agrees to proceed.         Electronically signed by Moon Cadet MD on 1/30/2023 at 11:00 AM

## 2023-01-30 NOTE — OP NOTE
KYPHOPLASTY OPERATIVE REPORT    Surgeon: Angelina Garrido MD    1/30/23    Pre-operative Diagnosis:   Hospital Problems             Last Modified POA    * (Principal) Closed wedge compression fracture of tenth thoracic vertebra ([address] 1/30/2023 Yes    Thoracic degenerative disc disease 1/30/2023 Yes       Post-operative Diagnosis: Same    INDICATION:  Martha Blevins has a history of osteoporosis and is not on osteoporosis medication. -T-10 balloon kyphoplasty is requested for therapeutic reasons. Please see H&P for details on previous treatments, examination findings, and work up. Conservative treatment was ineffective i.e.: ice, NSAIDS, rest, narcotic medication, chiropractic care, physical therapy and message therapy. Patient is unable to perform the following ADL's: ambulating and grooming     Pain Assessment: 0-10  Pain Level: 8 (Pain medication helps relieve pain)     Pain Orientation: Mid  Pain Location: Back  Pain Descriptors: Stabbing    Last Plain films: 2022    EXAMINATION:  T10 balloon kyphoplasty    CONSENT: Written consent was obtained from the patient on the preprinted consent form after explaining the procedure, indications, potential complications and outcomes. Alternative treatments were also discussed. DISCUSSION: The patient was sterilely prepped and draped in the usual fashion in the prone position. Time out was verified for correct patient, side, level and procedure. SEDATION: Patient was given general anesthesia during the procedure by  The CRNA. The patient was fairly sedated throughout the procedure and underwent constant continuous EKG, pulse oximetry and blood pressure monitoring independently by the CRNA/RN. SEDATION TIME:  45 minutes. PROCEDURE TIME:  45 minutes. PROCEDURE:  Using the image intensifier, the Bilateral T10 pedicles were identified in AP and lateral views, A 1 cm paramedian incision was made next to the skin marker.   A transpedicular, extrapedicular, en face approach was used. The osteo introducer was then inserted and advanced to the pedicle taking and AP and lateral views were used to confirm trajectory, depth, and placement utilizing 2 C-arms. The osteo introducer was then positioned 0.5cm just past the posterior vertebral body wall. The procedure was repeated on the Bilateral side at the same level. The inner cannula was removed and a drill was inserted into the vertebral body under fluoroscopic guidance creating a channel toward the anterior cortex. Bone biopsy was  taken. After completing entry into the vertebral body, an inflatable balloon tamp was inserted into the inner cannula bilaterally and advanced under fluoroscopic guidance into the vertebral body. Radiopaque markers on the bone tamp were identified using AP and lateral images. The bone tamp was then inflated to a max volume of 2 of contrast and a PSI of 400. The inflation was monitored with AP and lateral imaging. Direct reduction of the fracture was achieved with 100% of the height restored. The bone tamp was deflated and removed. Using fluoroscopic imaging and the use of the bone void fillers, internal fixation was achieved through the low pressure injection of Kyphon Xpede bone cement using the Kyphon Xpander cement delivery system. A total of 2 mL of cement was filled from the left side and 2 mL from the right side. Once the bone cement had hardened, the cannulas were removed. Post procedure, all incisions were closed with Steri-Strips and covered with gauze and Tegaderm to create a pressure dressing. The patient tolerated the procedure(s) well and without complications and was noted to be in stable condition prior to discharge from procedure center with discharge instructions. EBL: no blood loss    SPECIMEN: T10.     IMPRESSIONS:  T10 Balloon Kyphoplasty accomplished without incident  T10 Balloon Kyphoplasty   acute pain.     RECOMMENDATIONS :  Follow up with Dr. Vicki Adamson MD in the pain clinic in 2-4 weeks. Complete and return Post-Procedure Pain and Activity Diary.   Contact me for symptom exacerbation, fever or unusual symptoms. Post-procedure care according to verbal and written instructions at discharge. Osteoporosis medication is strongly recommended, if not already initiated, for reduction in risk of additional osteoporotic fractures. POST-PROCEDURE KYPHOPLASTY INTERPRETATION:    EXAMINATION: AP, lateral views. FLUOROSCOPY TIME: 145 seconds    DISCUSSION: Spot views of the spine reveal normal segmentation. Osteointroducers are positioned in the T10 pedicle bilaterally. Contrast in the bone tamp creates a cavity in the T10  vertebral body. The cement fills the cavity created by the balloon bone tamp. Extravasation of the cement was notseen. Fracture reduction is estimated to be 100% . Soft tissues reveal no abnormality. IMPRESSION: T10 Balloon Kyphoplasty reveals satisfactory contrast and cement fill and fracture reduction.     Electronically signed by Vicki Adamson MD on 1/30/2023 at 12:21 PM

## 2023-01-30 NOTE — ANESTHESIA POSTPROCEDURE EVALUATION
Department of Anesthesiology  Postprocedure Note    Patient: Kia Vasquez  MRN: [id number]  YOB: 1960  Date of evaluation: 1/30/2023      Procedure Summary     Date: 01/30/23 Room / Location: [address] [place] [address]    Anesthesia Start: 0131 Anesthesia Stop: 8141    Procedure: T10 KYPHOPLASTY (Back) Diagnosis:       Compression fracture of T10 vertebra, initial encounter ([address]      (Compression fracture of T10 vertebra, initial encounter ([address] Brian Herrera)    Surgeons: Umesh Martin MD Responsible Provider: SILVANA Dennis CRNA    Anesthesia Type: general ASA Status: 3          Anesthesia Type: No value filed.     Rudi Phase I: Rudi Score: 9    Rudi Phase II:        Anesthesia Post Evaluation    Patient location during evaluation: bedside  Patient participation: complete - patient participated  Level of consciousness: awake and alert  Pain score: 0  Airway patency: patent  Nausea & Vomiting: no vomiting and no nausea  Complications: no  Cardiovascular status: blood pressure returned to baseline  Respiratory status: acceptable and spontaneous ventilation  Hydration status: euvolemic

## 2023-01-30 NOTE — H&P
Subjective:      Patient ID: Michelle Sheridan is a 61 y.o. female. Chief Complaint   Patient presents with    Back Pain     Mid      HPI Initial new patient consult    Thoracic compression fracture, possible candidate for kyphoplasty    Norco prn with relief    Pain Assessment  Location of Pain: Back  Location Modifiers: Medial, Posterior  Severity of Pain: 7  Quality of Pain: Sharp (stabbing)  Duration of Pain: Persistent  Frequency of Pain: Constant  Date Pain First Started: 01/12/23  Aggravating Factors: Bending (laying lifting)  Limiting Behavior: Yes  Relieving Factors: Rest, Ice, Heat (meds)  Result of Injury: Yes  Work-Related Injury: No  Are there other pain locations you wish to document?: No    Allergies   Allergen Reactions    Latex Rash    Codeine Nausea Only and Nausea And Vomiting       Outpatient Medications Marked as Taking for the 1/18/23 encounter (Office Visit) with SILVANA Beltran CNP   Medication Sig Dispense Refill    HYDROcodone-acetaminophen (NORCO) 5-325 MG per tablet Take 1 tablet by mouth every 6 hours as needed for Pain for up to 5 days. Intended supply: 3 days. Take lowest dose possible to manage pain Max Daily Amount: 4 tablets 20 tablet 0    tiZANidine (ZANAFLEX) 4 MG tablet Take 1 tablet by mouth every 6 hours as needed (back spasms) 30 tablet 0    ezetimibe (ZETIA) 10 MG tablet TAKE 1 TABLET DAILY 90 tablet 3    losartan (COZAAR) 25 MG tablet Take 0.5 tablets by mouth 2 times daily 90 tablet 3    IMBRUVICA 140 MG chemo capsule Take 1 capsule by mouth daily       Multiple Vitamins-Minerals (MULTIVITAL PO) Take  by mouth.      calcium carbonate (OSCAL) 500 MG TABS tablet Take 500 mg by mouth daily. Vitamin D (CHOLECALCIFEROL) 1000 UNITS CAPS capsule Take 2,000 Units by mouth daily       aspirin 81 MG tablet Take 81 mg by mouth daily.          Past Medical History:   Diagnosis Date    Allergic rhinitis     Anxiety     Chronic sinusitis     CLL (chronic lymphocytic leukemia) [facility])     (follows with Dr. Robyn Uriostegui, Oncology Dept., [address] [facility]). Depression     Hemorrhoids     (internal and external)    Hyperlipidemia     Kidney stones     history of     Non Hodgkin's lymphoma ([address]     1998 remission for 10 years currently under [facility] care    Proctitis 2008       Past Surgical History:   Procedure Laterality Date    COLONOSCOPY  02/12/2008    (Dr. Iqra Adam) - External and internal hemorrhoids, nonspecific proctitis. (Path: Reactive change, mild). COLONOSCOPY  03/30/2015    internal hemorrhoids [facility] Dr. Zuleyma Parks ARTHROSCOPY Right 2018    Dr. Branden Cervantes - done at [facility]         Family History   Problem Relation Age of Onset    Heart Disease Mother     Coronary Art Dis Sister         (CABG)    Heart Disease Sister     High Blood Pressure Sister     Thyroid Disease Sister     Hypertension Sister     Thyroid Disease Sister     Hypertension Sister     Heart Attack Brother     Coronary Art Dis Brother         (with stent placement)    Coronary Art Dis Brother         (with stent placement)    Heart Attack Brother     Thyroid Disease Brother     Heart Attack Brother     Hypertension Brother     Hypertension Brother     Heart Disease Maternal Grandmother     Heart Disease Maternal Grandfather     Early Death Paternal Grandfather         electrocution    High Cholesterol Daughter        Social History     Socioeconomic History    Marital status:      Spouse name: None    Number of children: None    Years of education: None    Highest education level: None   Tobacco Use    Smoking status: Never    Smokeless tobacco: Never   Vaping Use    Vaping Use: Never used   Substance and Sexual Activity    Alcohol use: Yes     Comment: weekly, champagne, beer    Drug use: No     Review of Systems   Constitutional:  Positive for activity change. HENT: Negative. Eyes: Negative. Respiratory: Negative. Cardiovascular: Negative. Gastrointestinal: Negative. Genitourinary: Negative. Musculoskeletal:  Positive for arthralgias, back pain and myalgias. Skin: Negative. Neurological: Negative. Hematological: Negative. Psychiatric/Behavioral:  Positive for sleep disturbance. Objective:   Physical Exam  Vitals reviewed. Constitutional:       General: She is awake. She is not in acute distress. Appearance: Normal appearance. She is well-developed. She is not ill-appearing, toxic-appearing or diaphoretic. Interventions: She is not intubated. HENT:      Head: Normocephalic and atraumatic. Right Ear: External ear normal.      Left Ear: External ear normal.      Nose: Nose normal.      Mouth/Throat:      Lips: Pink. Mouth: Mucous membranes are moist.   Eyes:      General: Lids are normal.   Cardiovascular:      Rate and Rhythm: Normal rate. Pulmonary:      Effort: Pulmonary effort is normal. No tachypnea, bradypnea, accessory muscle usage, prolonged expiration, respiratory distress or retractions. She is not intubated. Breath sounds: No stridor. No wheezing, rhonchi or rales. Chest:      Chest wall: No tenderness. Abdominal:      Palpations: Abdomen is soft. Musculoskeletal:      Comments: MRI OF THE THORACIC SPINE WITHOUT CONTRAST  1/17/2023 1:27 pm     TECHNIQUE:  Multiplanar multisequence MRI of the thoracic spine was performed without the  administration of intravenous contrast.     COMPARISON:  Thoracic spine radiographs done 01/12/2013. HISTORY:  ORDERING SYSTEM PROVIDED HISTORY: Closed wedge compression fracture of T10  vertebra, initial encounter [facility])  TECHNOLOGIST PROVIDED HISTORY:  fall, landing on buttocks, pain to mid back, T10 compression fracture seen on  xray imaging  What is the sedation requirement?->None  Reason for Exam: Patient fell down the steps on 1/12/2023, landing on  buttocks.  Mid to lower thoracic spine pain, x-ray showed compression fracture  T10. Additional signs and symptoms: Closed wedge compression fracture of T10  vertebra, initial encounter     FINDINGS:  BONES/ALIGNMENT: Acute/subacute T10 vertebral body superior endplate  compression fracture with 30% height loss anteriorly and marrow edema  throughout the vertebral body which extends into the bilateral pedicles. No  significant posterior fracture retropulsion. No epidural hematoma or acute  ligamentous injury. No other acute or subacute vertebral body compression fracture. Normal  thoracic kyphosis. No significant listhesis. No marrow replacing process. No foci of suspicious bone marrow edema. SPINAL CORD: No abnormal cord signal is seen. SOFT TISSUES: No paraspinal mass identified. Mild anterior perivertebral  fluid/hemorrhage at the T9-T11 levels. DEGENERATIVE CHANGES: Mild multilevel degenerative disc desiccation and  height loss. Multilevel marginal osteophytes. Mild anterior T6-T7  degenerative endplate changes with patchy discogenic edema. No significant  spinal canal stenosis or neural foraminal narrowing of the thoracic spine. Impression  1. Acute/subacute T10 superior endplate compression fracture with 30% height  loss anteriorly. Marrow edema extends into the bilateral pedicles. No  significant posterior fracture retropulsion. 2.  No epidural hematoma or acute ligamentous injury. 3.  No significant thoracic spinal canal or neural foraminal narrowing. Skin:     General: Skin is warm and dry. Capillary Refill: Capillary refill takes less than 2 seconds. Coloration: Skin is not ashen, jaundiced or pale. Findings: No rash. Nails: There is no clubbing. Neurological:      Mental Status: She is alert and oriented to person, place, and time. GCS: GCS eye subscore is 4. GCS verbal subscore is 5. GCS motor subscore is 6. Cranial Nerves: No cranial nerve deficit.    Psychiatric:         Attention and Perception: Attention and perception normal.         Mood and Affect: Mood and affect normal.         Speech: Speech normal.         Behavior: Behavior is cooperative. Cognition and Memory: Cognition normal.         Judgment: Judgment normal.       Assessment / Plan:       Diagnosis Orders   1. Compression fracture of body of thoracic vertebra (HCC)  EKG 12 lead      2.  Acute midline thoracic back pain            Thoracic MRI reviewed, will schedule T10 kyphoplasty

## 2023-01-30 NOTE — INTERVAL H&P NOTE
I have interviewed and examined the patient and reviewed the recent History and Physical.  There have been no changes to the recent H&P documentation. The surgical consent form has been signed. Last anticoagulant medication use was:-    Premedication taken for contrast allergy? No    Valium taken for oral sedation? No    No outpatient medications have been marked as taking for the 1/30/23 encounter [facility] Encounter). The patient understands the planned operation and its associated risks and benefits and agrees to proceed.         Electronically signed by Quyen Casanova MD on 1/30/2023 at 11:01 AM

## 2023-02-14 ENCOUNTER — OFFICE VISIT (OUTPATIENT)
Dept: PAIN MANAGEMENT | Age: 63
End: 2023-02-14
Payer: COMMERCIAL

## 2023-02-14 VITALS
HEIGHT: 64 IN | SYSTOLIC BLOOD PRESSURE: 121 MMHG | BODY MASS INDEX: 22.53 KG/M2 | DIASTOLIC BLOOD PRESSURE: 67 MMHG | HEART RATE: 75 BPM | WEIGHT: 132 LBS | OXYGEN SATURATION: 98 % | RESPIRATION RATE: 17 BRPM

## 2023-02-14 DIAGNOSIS — M51.34 THORACIC DEGENERATIVE DISC DISEASE: ICD-10-CM

## 2023-02-14 DIAGNOSIS — M80.00XD AGE-RELATED OSTEOPOROSIS WITH CURRENT PATHOLOGICAL FRACTURE WITH ROUTINE HEALING, SUBSEQUENT ENCOUNTER: ICD-10-CM

## 2023-02-14 DIAGNOSIS — S22.000D THORACIC COMPRESSION FRACTURE, WITH ROUTINE HEALING, SUBSEQUENT ENCOUNTER: Primary | ICD-10-CM

## 2023-02-14 DIAGNOSIS — C44.92 SQUAMOUS CELL SKIN CANCER: ICD-10-CM

## 2023-02-14 PROCEDURE — 99214 OFFICE O/P EST MOD 30 MIN: CPT | Performed by: PHYSICAL MEDICINE & REHABILITATION

## 2023-02-14 NOTE — PROGRESS NOTES
PAIN MANAGEMENT FOLLOW-UP NOTE  2/14/23    CHIEF COMPLAINT: This is a57 y.o. female patientwho returns to the Pain Management Clinic with a history of Back Pain (lower)      PAIN HPI:Sarah Hudson returns today for  reevaluation. Since the visit, the patient reports that the pain is improved. HPI   Location: Back  Location Modifier: lower thoracic  Severity of Pain: 4  Duration of Pain: Intermittent  Frequency of Pain: Intermittent  Aggravating Factors:  -  Limiting Behavior: Twisting  Relieving Factors: relaxation        Previous pain medication trials have included:          Mental health:    Patient feels - secondary to their current pain problems as described above. H/O depression and anxiety: No   Patient is not seeing psychologist orpsychiatrist   Abuse history? No    Employed? No    ANALGESIA:   Are your Current Pain medication (s) helping to decrease pain? No.   Current Pain score:      ADVERSE AFFECTS:   Medication Side Effects: No.    ACTIVITY:  Are you able to be more active with your pain medications? No      ABERRANT BEHAVIORS SINCE LAST VISIT? No    Review of Systems     Allergies   Allergen Reactions    Latex Rash    Codeine Nausea Only and Nausea And Vomiting       Outpatient Medications Prior to Visit   Medication Sig Dispense Refill    tiZANidine (ZANAFLEX) 4 MG tablet Take 1 tablet by mouth every 6 hours as needed (back spasms) 30 tablet 11    ezetimibe (ZETIA) 10 MG tablet TAKE 1 TABLET DAILY 90 tablet 3    losartan (COZAAR) 25 MG tablet Take 0.5 tablets by mouth 2 times daily 90 tablet 3    IMBRUVICA 140 MG chemo capsule Take 1 capsule by mouth daily       Multiple Vitamins-Minerals (MULTIVITAL PO) Take  by mouth.      calcium carbonate (OSCAL) 500 MG TABS tablet Take 500 mg by mouth daily. Vitamin D (CHOLECALCIFEROL) 1000 UNITS CAPS capsule Take 2,000 Units by mouth daily       aspirin 81 MG tablet Take 81 mg by mouth daily. No facility-administered medications prior to visit. Past Medical History:   Diagnosis Date    Allergic rhinitis     Anxiety     Chronic sinusitis     CLL (chronic lymphocytic leukemia) ([facility] [address]     (follows with Dr. Quincy Bolanos, Oncology Dept., [address] [facility]). Depression     Hemorrhoids     (internal and external)    Hyperlipidemia     Kidney stones     history of     Non Hodgkin's lymphoma ([facility] [address]     1998 remission for 10 years currently under [facility] care    Proctitis 2008       Past Surgical History:   Procedure Laterality Date    COLONOSCOPY  02/12/2008    (Dr. Zahra De Jesus) - External and internal hemorrhoids, nonspecific proctitis. (Path: Reactive change, mild).     COLONOSCOPY  03/30/2015    internal hemorrhoids [facility] Dr. Tamy Fuentes ARTHROSCOPY Right 2018    Dr. Frandy Patterson - done at [facility] N/A 1/30/2023    T10 KYPHOPLASTY performed by Willy Horta MD at [facility]       Family History   Problem Relation Age of Onset    Heart Disease Mother     Coronary Art Dis Sister         (CABG)    Heart Disease Sister     High Blood Pressure Sister     Thyroid Disease Sister     Hypertension Sister     Thyroid Disease Sister     Hypertension Sister     Heart Attack Brother     Coronary Art Dis Brother         (with stent placement)    Coronary Art Dis Brother         (with stent placement)    Heart Attack Brother     Thyroid Disease Brother     Heart Attack Brother     Hypertension Brother     Hypertension Brother     Heart Disease Maternal Grandmother     Heart Disease Maternal Grandfather     Early Death Paternal Grandfather         electrocution    High Cholesterol Daughter      Social History     Socioeconomic History    Marital status:      Spouse name: None    Number of children: None    Years of education: None    Highest education level: None   Tobacco Use    Smoking status: Never    Smokeless tobacco: Never   Vaping Use    Vaping Use: Never used   Substance and Sexual Activity    Alcohol use: Yes     Comment: weekly, champagne, beer    Drug use: No         Family and Social Historyreviewed in the electronic medical record. Imaging:Reviewed available imaging in our system with the patient. No results found. Objective:     Physical Exam:  Vitals:    02/14/23 1044   BP: 121/67   Pulse: 75   Resp: 17   SpO2: 98%   Weight: 132 lb (59.9 kg)   Height: 5' 4\" (1.626 m)          Physical Exam  Ortho Exam                          Research  has found that  Spine injections    reduce pain and  give  better functional  outcomes. Assessment: This is a 61 y.o. female patient with:    Diagnosis:   Diagnosis Orders   1. Thoracic compression fracture, with routine healing, subsequent encounter  External Referral To Physical Therapy      2. Thoracic degenerative disc disease  External Referral To Physical Therapy      3. Age-related osteoporosis with current pathological fracture with routine healing, subsequent encounter  External Referral To Physical Therapy      4. Squamous cell skin cancer  External Referral To Physical Therapy          Medical Comorbidities:  As listed in the patient's past medical and surgical history    Functional Limitations:   Pain limits function and quality of life. Plan:     PT  in her home  [facility]  I    See PCP   for dexascan  order  or call  if   needed   Meds:   Controlled Substances Monitoring: Pt educated about the risks of taking opiates,including increased sedation, constipation, slowed breathing, tolerance, dependence,and addiction. New Prescriptions    No medications on file      No orders of the defined types were placed in this encounter.     Orders Placed This Encounter   Procedures    External Referral To Physical Therapy     Referral Priority:   Routine     Referral Type:   Eval and Treat     Referral Reason:   Specialty Services Required     Requested Specialty:   Physical Therapist     Number of Visits Requested:   1 No follow-ups on file. Opioid medication has  significant  risk  benefit concerns. We  instruct    our patients that  a Random Urine Drug Screen is required  along with a  an Opioid assessment questionaire such as ORT  or SOAPP  The patient expressed understanding of the above assessment and plan. Totaltime spent face to face with patient was 25 minutes inwhich  50% or more of the time was spent in counseling, education about risk andbenefits of the above plan, and coordination of care.

## 2023-04-17 DIAGNOSIS — F41.9 ANXIETY: Primary | ICD-10-CM

## 2023-04-17 NOTE — TELEPHONE ENCOUNTER
Pt calling stating she had restarted this on her own as she had a few leftover from previous and now pt is needing a refill, will you fill or do you need to see, please advise.

## 2023-04-17 NOTE — TELEPHONE ENCOUNTER
Per [facility], last fill 1/5/22, quantity 30 for 30 days.       Kacie Jaime called requesting a refill of the below medication which has been pended for you:     Requested Prescriptions     Pending Prescriptions Disp Refills    ALPRAZolam (XANAX) 0.5 MG tablet 30 tablet 0     Sig: take 1 tablet by mouth nightly if needed for sleep or anxiety       Last Appointment Date: 6/22/22  Next Appointment Date: 6/28/2023    Allergies   Allergen Reactions    Latex Rash    Codeine Nausea Only and Nausea And Vomiting

## 2023-04-20 RX ORDER — ALPRAZOLAM 0.5 MG/1
TABLET ORAL
Qty: 30 TABLET | Refills: 0 | Status: SHIPPED | OUTPATIENT
Start: 2023-04-20 | End: 2023-05-17

## 2023-04-20 NOTE — TELEPHONE ENCOUNTER
Controlled Substance Monitoring:    Acute and Chronic Pain Monitoring:   RX Monitoring 4/20/2023   Attestation -   Periodic Controlled Substance Monitoring No signs of potential drug abuse or diversion identified.     -

## 2023-06-28 ENCOUNTER — HOSPITAL ENCOUNTER (OUTPATIENT)
Age: 63
Discharge: HOME | End: 2023-06-28
Payer: COMMERCIAL

## 2023-06-28 ENCOUNTER — OFFICE VISIT (OUTPATIENT)
Dept: FAMILY MEDICINE CLINIC | Age: 63
End: 2023-06-28
Payer: COMMERCIAL

## 2023-06-28 ENCOUNTER — HOSPITAL ENCOUNTER (OUTPATIENT)
Age: 63
Setting detail: SPECIMEN
Discharge: HOME | End: 2023-06-28
Payer: COMMERCIAL

## 2023-06-28 VITALS
TEMPERATURE: 97.5 F | WEIGHT: 130.4 LBS | BODY MASS INDEX: 22.26 KG/M2 | DIASTOLIC BLOOD PRESSURE: 70 MMHG | HEART RATE: 73 BPM | HEIGHT: 64 IN | RESPIRATION RATE: 16 BRPM | OXYGEN SATURATION: 98 % | SYSTOLIC BLOOD PRESSURE: 122 MMHG

## 2023-06-28 DIAGNOSIS — E78.5 HYPERLIPIDEMIA, UNSPECIFIED HYPERLIPIDEMIA TYPE: Primary | ICD-10-CM

## 2023-06-28 DIAGNOSIS — H91.91 HEARING LOSS OF RIGHT EAR, UNSPECIFIED HEARING LOSS TYPE: ICD-10-CM

## 2023-06-28 DIAGNOSIS — E78.5 HYPERLIPIDEMIA, UNSPECIFIED HYPERLIPIDEMIA TYPE: ICD-10-CM

## 2023-06-28 DIAGNOSIS — L98.9 SKIN LESION OF FACE: ICD-10-CM

## 2023-06-28 DIAGNOSIS — Z78.0 POST-MENOPAUSAL: ICD-10-CM

## 2023-06-28 DIAGNOSIS — F41.9 ANXIETY: ICD-10-CM

## 2023-06-28 DIAGNOSIS — L98.9 SKIN LESION OF CHEST WALL: ICD-10-CM

## 2023-06-28 DIAGNOSIS — S22.070S CLOSED WEDGE COMPRESSION FRACTURE OF T10 VERTEBRA, SEQUELA: ICD-10-CM

## 2023-06-28 DIAGNOSIS — I10 ESSENTIAL HYPERTENSION: ICD-10-CM

## 2023-06-28 DIAGNOSIS — Z00.00 ROUTINE MEDICAL EXAM: Primary | ICD-10-CM

## 2023-06-28 DIAGNOSIS — H73.91 ABNORMAL TYMPANIC MEMBRANE OF RIGHT EAR: ICD-10-CM

## 2023-06-28 LAB
ALBUMIN SERPL-MCNC: 4.2 G/DL (ref 3.5–5.2)
ALBUMIN/GLOB SERPL: 1.8 {RATIO} (ref 1–2.5)
ALP SERPL-CCNC: 82 U/L (ref 35–104)
ALT SERPL-CCNC: 23 U/L (ref 5–33)
ANION GAP SERPL CALCULATED.3IONS-SCNC: 9 MMOL/L (ref 9–17)
AST SERPL-CCNC: 19 U/L
BILIRUB SERPL-MCNC: 0.5 MG/DL (ref 0.3–1.2)
BUN SERPL-MCNC: 17 MG/DL (ref 8–23)
BUN/CREAT SERPL: 28 (ref 9–20)
CALCIUM SERPL-MCNC: 9.8 MG/DL (ref 8.6–10.4)
CHLORIDE SERPL-SCNC: 106 MMOL/L (ref 98–107)
CHOLEST SERPL-MCNC: 199 MG/DL
CHOLESTEROL/HDL RATIO: 2.6
CO2 SERPL-SCNC: 28 MMOL/L (ref 20–31)
CREAT SERPL-MCNC: 0.6 MG/DL (ref 0.5–0.9)
GFR SERPL CREATININE-BSD FRML MDRD: >60 ML/MIN/1.73M2
GLUCOSE SERPL-MCNC: 98 MG/DL (ref 70–99)
HDLC SERPL-MCNC: 77 MG/DL
LDLC SERPL CALC-MCNC: 110 MG/DL (ref 0–130)
POTASSIUM SERPL-SCNC: 5 MMOL/L (ref 3.7–5.3)
PROT SERPL-MCNC: 6.6 G/DL (ref 6.4–8.3)
SODIUM SERPL-SCNC: 143 MMOL/L (ref 135–144)
TRIGL SERPL-MCNC: 59 MG/DL

## 2023-06-28 PROCEDURE — 80053 COMPREHEN METABOLIC PANEL: CPT

## 2023-06-28 PROCEDURE — 36415 COLL VENOUS BLD VENIPUNCTURE: CPT

## 2023-06-28 PROCEDURE — 99396 PREV VISIT EST AGE 40-64: CPT | Performed by: FAMILY MEDICINE

## 2023-06-28 PROCEDURE — 80061 LIPID PANEL: CPT

## 2023-06-28 RX ORDER — ALPRAZOLAM 0.5 MG/1
0.5 TABLET ORAL NIGHTLY PRN
Qty: 30 TABLET | Refills: 0 | Status: SHIPPED | OUTPATIENT
Start: 2023-06-28 | End: 2023-07-28

## 2023-06-28 ASSESSMENT — PATIENT HEALTH QUESTIONNAIRE - PHQ9
SUM OF ALL RESPONSES TO PHQ QUESTIONS 1-9: 0
1. LITTLE INTEREST OR PLEASURE IN DOING THINGS: 0
2. FEELING DOWN, DEPRESSED OR HOPELESS: 0
SUM OF ALL RESPONSES TO PHQ9 QUESTIONS 1 & 2: 0
SUM OF ALL RESPONSES TO PHQ QUESTIONS 1-9: 0

## 2023-07-11 ENCOUNTER — HOSPITAL ENCOUNTER (OUTPATIENT)
Dept: BONE DENSITY | Age: 63
Discharge: HOME | End: 2023-07-13
Attending: FAMILY MEDICINE
Payer: COMMERCIAL

## 2023-07-11 DIAGNOSIS — S22.070S CLOSED WEDGE COMPRESSION FRACTURE OF T10 VERTEBRA, SEQUELA: ICD-10-CM

## 2023-07-11 DIAGNOSIS — Z78.0 POST-MENOPAUSAL: ICD-10-CM

## 2023-07-11 PROCEDURE — 77080 DXA BONE DENSITY AXIAL: CPT

## 2023-07-11 ASSESSMENT — ENCOUNTER SYMPTOMS: BACK PAIN: 1

## 2023-07-26 DIAGNOSIS — I10 ESSENTIAL HYPERTENSION: ICD-10-CM

## 2023-07-26 NOTE — TELEPHONE ENCOUNTER
The patient called requesting a refill of losartan to be sent to [facility] in [facility]. The pharmacy told the patient there were no refills on file.

## 2023-07-27 RX ORDER — LOSARTAN POTASSIUM 25 MG/1
12.5 TABLET ORAL 2 TIMES DAILY
Qty: 90 TABLET | Refills: 3 | Status: SHIPPED | OUTPATIENT
Start: 2023-07-27

## 2023-08-16 ENCOUNTER — OFFICE VISIT (OUTPATIENT)
Dept: CARDIOLOGY | Age: 63
End: 2023-08-16
Payer: COMMERCIAL

## 2023-08-16 VITALS
DIASTOLIC BLOOD PRESSURE: 60 MMHG | SYSTOLIC BLOOD PRESSURE: 116 MMHG | HEART RATE: 70 BPM | WEIGHT: 128.8 LBS | BODY MASS INDEX: 22.11 KG/M2

## 2023-08-16 DIAGNOSIS — E78.5 HYPERLIPIDEMIA, UNSPECIFIED HYPERLIPIDEMIA TYPE: ICD-10-CM

## 2023-08-16 DIAGNOSIS — I10 ESSENTIAL HYPERTENSION: Primary | ICD-10-CM

## 2023-08-16 PROCEDURE — 93000 ELECTROCARDIOGRAM COMPLETE: CPT | Performed by: INTERNAL MEDICINE

## 2023-08-16 PROCEDURE — 3078F DIAST BP <80 MM HG: CPT | Performed by: INTERNAL MEDICINE

## 2023-08-16 PROCEDURE — 3074F SYST BP LT 130 MM HG: CPT | Performed by: INTERNAL MEDICINE

## 2023-08-16 PROCEDURE — 99214 OFFICE O/P EST MOD 30 MIN: CPT | Performed by: INTERNAL MEDICINE

## 2023-08-16 NOTE — PROGRESS NOTES
alcohol use. She reports that she does not use drugs. Family History: family history includes Cancer in her brother; Coronary Art Dis in her brother, brother, and sister; Early Death in her paternal grandfather; Heart Attack in her brother, brother, and brother; Heart Disease in her maternal grandfather, maternal grandmother, mother, and sister; High Blood Pressure in her sister; High Cholesterol in her daughter; Hypertension in her brother, brother, sister, and sister; Thyroid Disease in her brother, sister, and sister. No h/o sudden cardiac death. No for premature CAD    REVIEW OF SYSTEMS:    Constitutional: there has been no unanticipated weight loss. There's been No change in energy level, No change in activity level. Eyes: No visual changes or diplopia. No scleral icterus. ENT: No Headaches, hearing loss or vertigo. No mouth sores or sore throat. Cardiovascular: see above  Respiratory: see above  Gastrointestinal: No abdominal pain, appetite loss, blood in stools. Genitourinary: No dysuria, trouble voiding, or hematuria. Musculoskeletal:  No gait disturbance, No weakness or joint complaints. Integumentary: No rash or pruritis. Neurological: No headache or diplopia. No tingling  Psychiatric: No anxiety, or depression. Endocrine: No temperature intolerance. Hematologic/Lymphatic: No abnormal bruising or bleeding, blood clots or swollen lymph nodes. Allergic/Immunologic: No nasal congestion or hives. PHYSICAL EXAM:      Vitals:    08/16/23 1459   BP: 116/60   Pulse: 70       Constitutional and General Appearance: alert, cooperative, no distress and appears stated age  HEENT: PERRL, no cervical lymphadenopathy. No masses palpable. Normal oral mucosa  Respiratory:  Normal excursion and expansion without use of accessory muscles  Resp Auscultation: Good respiratory effort. No for increased work of breathing.  On auscultation: clear to auscultation bilaterally  Cardiovascular:  Heart tones are

## 2023-08-21 DIAGNOSIS — E78.00 PURE HYPERCHOLESTEROLEMIA: ICD-10-CM

## 2023-08-21 NOTE — TELEPHONE ENCOUNTER
Daniela Mormon called requesting a refill of the below medication which has been pended for you:     Requested Prescriptions     Pending Prescriptions Disp Refills    ezetimibe (ZETIA) 10 MG tablet [Pharmacy Med Name: EZETIMIBE TABS 10MG] 90 tablet 3     Sig: TAKE 1 TABLET DAILY       Last Appointment Date: 6/28/2023  Next Appointment Date: 7/1/2024    Allergies   Allergen Reactions    Latex Rash    Codeine Nausea Only and Nausea And Vomiting

## 2023-08-22 ENCOUNTER — OFFICE VISIT (OUTPATIENT)
Dept: PRIMARY CARE CLINIC | Age: 63
End: 2023-08-22
Payer: COMMERCIAL

## 2023-08-22 ENCOUNTER — HOSPITAL ENCOUNTER (OUTPATIENT)
Dept: GENERAL RADIOLOGY | Age: 63
Discharge: HOME | End: 2023-08-24
Payer: COMMERCIAL

## 2023-08-22 VITALS
OXYGEN SATURATION: 99 % | SYSTOLIC BLOOD PRESSURE: 110 MMHG | BODY MASS INDEX: 22.09 KG/M2 | HEART RATE: 74 BPM | TEMPERATURE: 97.8 F | WEIGHT: 129.38 LBS | DIASTOLIC BLOOD PRESSURE: 70 MMHG | RESPIRATION RATE: 14 BRPM | HEIGHT: 64 IN

## 2023-08-22 DIAGNOSIS — M25.571 ACUTE RIGHT ANKLE PAIN: Primary | ICD-10-CM

## 2023-08-22 DIAGNOSIS — M25.571 ACUTE RIGHT ANKLE PAIN: ICD-10-CM

## 2023-08-22 PROCEDURE — 99213 OFFICE O/P EST LOW 20 MIN: CPT | Performed by: STUDENT IN AN ORGANIZED HEALTH CARE EDUCATION/TRAINING PROGRAM

## 2023-08-22 PROCEDURE — 73610 X-RAY EXAM OF ANKLE: CPT

## 2023-08-22 RX ORDER — NAPROXEN 500 MG/1
500 TABLET ORAL 2 TIMES DAILY WITH MEALS
Qty: 60 TABLET | Refills: 0 | Status: SHIPPED | OUTPATIENT
Start: 2023-08-22

## 2023-08-22 ASSESSMENT — ENCOUNTER SYMPTOMS
VOMITING: 0
DIARRHEA: 0
CONSTIPATION: 0
SHORTNESS OF BREATH: 0
NAUSEA: 0
ABDOMINAL PAIN: 0
BLOOD IN STOOL: 0
TROUBLE SWALLOWING: 0
EYE PAIN: 0
COUGH: 0

## 2023-08-24 RX ORDER — EZETIMIBE 10 MG/1
10 TABLET ORAL DAILY
Qty: 90 TABLET | Refills: 3 | Status: SHIPPED | OUTPATIENT
Start: 2023-08-24

## 2023-08-30 ENCOUNTER — TELEPHONE (OUTPATIENT)
Dept: PRIMARY CARE CLINIC | Age: 63
End: 2023-08-30

## 2023-08-30 NOTE — TELEPHONE ENCOUNTER
----- Message from Elva Keyes DO sent at 8/30/2023  2:00 PM EDT -----  No abnormalities seen in the ankle XR. No fractures.

## 2023-09-24 DIAGNOSIS — M25.571 ACUTE RIGHT ANKLE PAIN: ICD-10-CM

## 2023-09-25 RX ORDER — NAPROXEN 500 MG/1
500 TABLET ORAL 2 TIMES DAILY PRN
Qty: 60 TABLET | Refills: 1 | Status: SHIPPED | OUTPATIENT
Start: 2023-09-25

## 2023-09-25 NOTE — TELEPHONE ENCOUNTER
Evgeny Carpenter called requesting a refill of the below medication which has been pended for you:     Requested Prescriptions     Pending Prescriptions Disp Refills    naproxen (NAPROSYN) 500 MG tablet [Pharmacy Med Name: NAPROXEN 500MG TABLETS] 60 tablet 0     Sig: TAKE 1 TABLET BY MOUTH TWICE DAILY WITH MEALS       Last Appointment Date: 8/22/2023  Next Appointment Date: Visit date not found    Allergies   Allergen Reactions    Latex Rash    Codeine Nausea Only and Nausea And Vomiting

## 2023-11-07 ENCOUNTER — OFFICE VISIT (OUTPATIENT)
Dept: PRIMARY CARE CLINIC | Age: 63
End: 2023-11-07
Payer: COMMERCIAL

## 2023-11-07 ENCOUNTER — TELEPHONE (OUTPATIENT)
Dept: MRI IMAGING | Age: 63
End: 2023-11-07

## 2023-11-07 VITALS
WEIGHT: 133 LBS | OXYGEN SATURATION: 98 % | TEMPERATURE: 98 F | HEART RATE: 74 BPM | DIASTOLIC BLOOD PRESSURE: 68 MMHG | BODY MASS INDEX: 22.83 KG/M2 | SYSTOLIC BLOOD PRESSURE: 110 MMHG

## 2023-11-07 DIAGNOSIS — M23.92 INTERNAL DERANGEMENT OF LEFT KNEE: Primary | ICD-10-CM

## 2023-11-07 PROCEDURE — 99213 OFFICE O/P EST LOW 20 MIN: CPT | Performed by: FAMILY MEDICINE

## 2023-11-07 ASSESSMENT — PATIENT HEALTH QUESTIONNAIRE - PHQ9
2. FEELING DOWN, DEPRESSED OR HOPELESS: 0
SUM OF ALL RESPONSES TO PHQ QUESTIONS 1-9: 0
SUM OF ALL RESPONSES TO PHQ QUESTIONS 1-9: 0
SUM OF ALL RESPONSES TO PHQ9 QUESTIONS 1 & 2: 0
SUM OF ALL RESPONSES TO PHQ QUESTIONS 1-9: 0
SUM OF ALL RESPONSES TO PHQ QUESTIONS 1-9: 0
1. LITTLE INTEREST OR PLEASURE IN DOING THINGS: 0

## 2023-11-07 ASSESSMENT — ENCOUNTER SYMPTOMS
RESPIRATORY NEGATIVE: 1
EYES NEGATIVE: 1
GASTROINTESTINAL NEGATIVE: 1

## 2023-11-07 NOTE — PROGRESS NOTES
Subjective:      Patient ID: She Alvarenga is a 61 y.o. female. HPI    acute walk in clinic visit for left knee injury about a month ago. Pain radiated down the leg. Felt a lump in the back of the knee. No fall to report . She might have twisted it getting up off a dock. Knee has not returned to normal over the last month. The more she is on the knee, the more it bothers her. Cant do yoga at present due to pain with movement. Crossing the left leg over the right knee causes pain. Minimal swelling if any perceived. Reminds her of torn meniscus she had on the right knee about 5 yrs ago. No catching or locking. No obvious laxity or effusion. Past Medical History:   Diagnosis Date    Allergic rhinitis     Anxiety     Chronic sinusitis     CLL (chronic lymphocytic leukemia) ([address] [facility])     (follows with Dr. River Garcia, Oncology Dept., [address] [place] [address]). Depression     Hemorrhoids     (internal and external)    Hyperlipidemia     Kidney stones     history of     Non Hodgkin's lymphoma ([address] [facility])     1998 remission for 10 years currently under [facility] care    Proctitis 2008     Past Surgical History:   Procedure Laterality Date    COLONOSCOPY  02/12/2008    (Dr. Dorinda Rivera) - External and internal hemorrhoids, nonspecific proctitis. (Path: Reactive change, mild).     COLONOSCOPY  03/30/2015    internal hemorrhoids [facility] Dr. Joni Almeida Right 2018    Dr. Juma Good - done at [address] [place] [address] N/A 1/30/2023    T10 KYPHOPLASTY performed by Oliver Reradon MD at [address] [facility]       Current Outpatient Medications   Medication Sig Dispense Refill    ezetimibe (ZETIA) 10 MG tablet Take 1 tablet by mouth daily 90 tablet 3    losartan (COZAAR) 25 MG tablet Take 0.5 tablets by mouth 2 times daily 90 tablet 3    IMBRUVICA 140 MG chemo capsule Take 1 tablet by mouth daily      Multiple Vitamins-Minerals (MULTIVITAL PO) Take  by mouth.      calcium

## 2023-11-07 NOTE — TELEPHONE ENCOUNTER
Sarah's MRI order for her knee needs to be changed to without contrast. We do not need contrast with that diagnosis per Radiology protocol. Please cancel the with and without order.     Thank you

## 2023-11-27 ENCOUNTER — HOSPITAL ENCOUNTER (OUTPATIENT)
Dept: MRI IMAGING | Age: 63
Discharge: HOME | End: 2023-11-29
Attending: FAMILY MEDICINE
Payer: COMMERCIAL

## 2023-11-27 DIAGNOSIS — M23.92 INTERNAL DERANGEMENT OF LEFT KNEE: ICD-10-CM

## 2023-11-27 PROCEDURE — 73721 MRI JNT OF LWR EXTRE W/O DYE: CPT

## 2023-12-01 ENCOUNTER — TELEPHONE (OUTPATIENT)
Dept: FAMILY MEDICINE CLINIC | Age: 63
End: 2023-12-01

## 2023-12-01 NOTE — TELEPHONE ENCOUNTER
Patient is calling back for results on MRI of left knee. Patient states she saw results via my chart and is going to wait until after holidays to see how she is feeling with her knee.

## 2023-12-29 DIAGNOSIS — F41.9 ANXIETY: ICD-10-CM

## 2023-12-29 NOTE — TELEPHONE ENCOUNTER
[facility] Clinical Staff  Subject: Refill Request    QUESTIONS  Name of Medication? ALPRAZolam (XANAX) 0.5 MG tablet  Patient-reported dosage and instructions? Prescribed as 1 tablet per night  but only takes about 1/4 every night  How many days do you have left? 7  Preferred Pharmacy? [id number]  Pharmacy phone number (if available)? [phone]  ---------------------------------------------------------------------------  --------------  CALL BACK INFO  What is the best way for the office to contact you? OK to leave message on  voicemail  Preferred Call Back Phone Number? [phone]  ---------------------------------------------------------------------------  --------------  SCRIPT ANSWERS  Relationship to Patient?  Self

## 2023-12-29 NOTE — TELEPHONE ENCOUNTER
Per [facility], last fill 06/28/23, quantity 30 for 30 days. Maira Lopez called requesting a refill of the below medication which has been pended for you:     Requested Prescriptions     Pending Prescriptions Disp Refills    ALPRAZolam (XANAX) 0.5 MG tablet 30 tablet 0     Sig: Take 1 tablet by mouth nightly as needed for Sleep or Anxiety for up to 30 days.  Max Daily Amount: 0.5 mg       Last Appointment Date: 6/28/2023  Next Appointment Date: 7/1/2024    Allergies   Allergen Reactions    Latex Rash    Codeine Nausea Only and Nausea And Vomiting

## 2023-12-31 RX ORDER — ALPRAZOLAM 0.5 MG/1
0.5 TABLET ORAL NIGHTLY PRN
Qty: 30 TABLET | Refills: 0 | Status: SHIPPED | OUTPATIENT
Start: 2023-12-31 | End: 2024-01-30

## 2024-06-30 ASSESSMENT — PATIENT HEALTH QUESTIONNAIRE - PHQ9
2. FEELING DOWN, DEPRESSED OR HOPELESS: NOT AT ALL
SUM OF ALL RESPONSES TO PHQ QUESTIONS 1-9: 0
1. LITTLE INTEREST OR PLEASURE IN DOING THINGS: NOT AT ALL
1. LITTLE INTEREST OR PLEASURE IN DOING THINGS: NOT AT ALL
SUM OF ALL RESPONSES TO PHQ9 QUESTIONS 1 & 2: 0
SUM OF ALL RESPONSES TO PHQ QUESTIONS 1-9: 0
2. FEELING DOWN, DEPRESSED OR HOPELESS: NOT AT ALL
SUM OF ALL RESPONSES TO PHQ QUESTIONS 1-9: 0
SUM OF ALL RESPONSES TO PHQ QUESTIONS 1-9: 0
SUM OF ALL RESPONSES TO PHQ9 QUESTIONS 1 & 2: 0

## 2024-07-01 ENCOUNTER — OFFICE VISIT (OUTPATIENT)
Dept: FAMILY MEDICINE CLINIC | Age: 64
End: 2024-07-01
Payer: COMMERCIAL

## 2024-07-01 VITALS
WEIGHT: 130.8 LBS | DIASTOLIC BLOOD PRESSURE: 72 MMHG | HEIGHT: 64 IN | RESPIRATION RATE: 16 BRPM | BODY MASS INDEX: 22.33 KG/M2 | HEART RATE: 71 BPM | OXYGEN SATURATION: 99 % | TEMPERATURE: 97.7 F | SYSTOLIC BLOOD PRESSURE: 138 MMHG

## 2024-07-01 DIAGNOSIS — E78.00 PURE HYPERCHOLESTEROLEMIA: ICD-10-CM

## 2024-07-01 DIAGNOSIS — I10 PRIMARY HYPERTENSION: ICD-10-CM

## 2024-07-01 DIAGNOSIS — I10 ESSENTIAL HYPERTENSION: ICD-10-CM

## 2024-07-01 DIAGNOSIS — Z00.00 ROUTINE MEDICAL EXAM: Primary | ICD-10-CM

## 2024-07-01 PROCEDURE — 99396 PREV VISIT EST AGE 40-64: CPT | Performed by: FAMILY MEDICINE

## 2024-07-01 PROCEDURE — 3075F SYST BP GE 130 - 139MM HG: CPT | Performed by: FAMILY MEDICINE

## 2024-07-01 PROCEDURE — 3078F DIAST BP <80 MM HG: CPT | Performed by: FAMILY MEDICINE

## 2024-07-01 RX ORDER — LOSARTAN POTASSIUM 25 MG/1
12.5 TABLET ORAL 2 TIMES DAILY
Qty: 90 TABLET | Refills: 3 | Status: SHIPPED | OUTPATIENT
Start: 2024-07-01

## 2024-07-01 SDOH — ECONOMIC STABILITY: HOUSING INSECURITY
IN THE LAST 12 MONTHS, WAS THERE A TIME WHEN YOU DID NOT HAVE A STEADY PLACE TO SLEEP OR SLEPT IN A SHELTER (INCLUDING NOW)?: NO

## 2024-07-01 SDOH — ECONOMIC STABILITY: FOOD INSECURITY: WITHIN THE PAST 12 MONTHS, THE FOOD YOU BOUGHT JUST DIDN'T LAST AND YOU DIDN'T HAVE MONEY TO GET MORE.: NEVER TRUE

## 2024-07-01 SDOH — ECONOMIC STABILITY: FOOD INSECURITY: WITHIN THE PAST 12 MONTHS, YOU WORRIED THAT YOUR FOOD WOULD RUN OUT BEFORE YOU GOT MONEY TO BUY MORE.: NEVER TRUE

## 2024-07-01 SDOH — ECONOMIC STABILITY: INCOME INSECURITY: HOW HARD IS IT FOR YOU TO PAY FOR THE VERY BASICS LIKE FOOD, HOUSING, MEDICAL CARE, AND HEATING?: NOT HARD AT ALL

## 2024-07-01 NOTE — TELEPHONE ENCOUNTER
Med prescribed by Cardio.    Sarah called requesting a refill of the below medication which has been pended for you:     Requested Prescriptions     Pending Prescriptions Disp Refills    losartan (COZAAR) 25 MG tablet 90 tablet 3     Sig: Take 0.5 tablets by mouth 2 times daily       Last Appointment Date: 7/1/2024  Next Appointment Date: 7/1/2025    Allergies   Allergen Reactions    Latex Rash    Codeine Nausea Only and Nausea And Vomiting

## 2024-07-01 NOTE — PROGRESS NOTES
[facility]  [address] [id number]  [phone]      Sarah Silva is a 64 y.o. female who presents today for her medical conditions/complaints as noted below.  Sarah Silva is c/o of Annual Exam      HPI:     Pt here today for yearly physical.    Doing yoga 3x's weekly; trying to walk when she can.      Taking Zetia 10 mg daily and ASA 81 mg daily per Cardiology for HYPERLIPIDEMIA.       BP well-controlled today - 138/72.  Taking Losartan 12.5 mg (1/2 of 25 mg tab) BID for HTN.  Has not checked BP at home recently.     Seeing Oncology at [facility] every 6 months for CLL; last visit 5/28/24 with Roseann Go.  Taking 140 mg Imbruvica daily.     Saw Gyn at [facility] on 6/1 - will be due for pap next year.  Just had mammogram in 10/2023 - unsure when next one is scheduled.    Still taking MVI, Calcium, and VIt D3 2000 IU daily.  Uses OTC nasal spray     Taking Claritin daily and OTC nasal spray daily (per ENT) - does seem to be helping with her eustachain tube dysfunction she was having last year.    Seeing Ramya Carver (Derm in [place]) once yearly.  Had some lesions treated with cryotherapy at last visit.     Has not been taking Xanax very often at all - took one dose in the past few months.  Sleep has been stable without.          Past Medical History:   Diagnosis Date    Allergic rhinitis     Anxiety     Chronic sinusitis     CLL (chronic lymphocytic leukemia) (HCC)     (follows with Dr. Forbes, Oncology Dept., [facility]).    Depression     Hemorrhoids     (internal and external)    Hyperlipidemia     Kidney stones     history of     Non Hodgkin's lymphoma (HCC)     1998 remission for 10 years [facility]    Proctitis 2008      Past Surgical History:   Procedure Laterality Date    COLONOSCOPY  02/12/2008    (Dr. Davis) - External and internal hemorrhoids, nonspecific proctitis. (Path: Reactive change, mild).    COLONOSCOPY

## 2024-08-13 DIAGNOSIS — E78.00 PURE HYPERCHOLESTEROLEMIA: ICD-10-CM

## 2024-08-13 NOTE — TELEPHONE ENCOUNTER
Sarah called requesting a refill of the below medication which has been pended for you:     Requested Prescriptions     Pending Prescriptions Disp Refills    ezetimibe (ZETIA) 10 MG tablet [Pharmacy Med Name: EZETIMIBE TABS 10MG] 90 tablet 3     Sig: TAKE 1 TABLET DAILY       Last Appointment Date: 7/1/2024  Next Appointment Date: 7/1/2025    Allergies   Allergen Reactions    Latex Rash    Codeine Nausea Only and Nausea And Vomiting

## 2024-08-15 RX ORDER — EZETIMIBE 10 MG/1
10 TABLET ORAL DAILY
Qty: 90 TABLET | Refills: 3 | Status: SHIPPED | OUTPATIENT
Start: 2024-08-15

## 2024-09-11 ENCOUNTER — TELEPHONE (OUTPATIENT)
Dept: CARDIOLOGY | Age: 64
End: 2024-09-11

## 2024-09-11 ENCOUNTER — HOSPITAL ENCOUNTER (OUTPATIENT)
Age: 64
Discharge: HOME | End: 2024-09-11
Payer: COMMERCIAL

## 2024-09-11 ENCOUNTER — OFFICE VISIT (OUTPATIENT)
Dept: CARDIOLOGY | Age: 64
End: 2024-09-11
Payer: COMMERCIAL

## 2024-09-11 VITALS
RESPIRATION RATE: 12 BRPM | SYSTOLIC BLOOD PRESSURE: 136 MMHG | HEIGHT: 64 IN | HEART RATE: 58 BPM | BODY MASS INDEX: 22.53 KG/M2 | DIASTOLIC BLOOD PRESSURE: 74 MMHG | OXYGEN SATURATION: 100 % | WEIGHT: 132 LBS

## 2024-09-11 DIAGNOSIS — E78.00 PURE HYPERCHOLESTEROLEMIA: ICD-10-CM

## 2024-09-11 DIAGNOSIS — I10 PRIMARY HYPERTENSION: ICD-10-CM

## 2024-09-11 DIAGNOSIS — I10 ESSENTIAL HYPERTENSION: Primary | ICD-10-CM

## 2024-09-11 DIAGNOSIS — E78.5 HYPERLIPIDEMIA, UNSPECIFIED HYPERLIPIDEMIA TYPE: ICD-10-CM

## 2024-09-11 LAB
ALBUMIN SERPL-MCNC: 4.1 G/DL (ref 3.5–5.2)
ALBUMIN/GLOB SERPL: 2 {RATIO} (ref 1–2.5)
ALP SERPL-CCNC: 74 U/L (ref 35–104)
ALT SERPL-CCNC: 19 U/L (ref 5–33)
ANION GAP SERPL CALCULATED.3IONS-SCNC: 7 MMOL/L (ref 9–17)
AST SERPL-CCNC: 23 U/L
BILIRUB SERPL-MCNC: 0.5 MG/DL (ref 0.3–1.2)
BUN SERPL-MCNC: 12 MG/DL (ref 8–23)
BUN/CREAT SERPL: 20 (ref 9–20)
CALCIUM SERPL-MCNC: 9.3 MG/DL (ref 8.6–10.4)
CHLORIDE SERPL-SCNC: 106 MMOL/L (ref 98–107)
CHOLEST SERPL-MCNC: 214 MG/DL (ref 0–199)
CHOLESTEROL/HDL RATIO: 2
CO2 SERPL-SCNC: 29 MMOL/L (ref 20–31)
CREAT SERPL-MCNC: 0.6 MG/DL (ref 0.5–0.9)
GFR, ESTIMATED: >90 ML/MIN/1.73M2
GLUCOSE SERPL-MCNC: 100 MG/DL (ref 70–99)
HDLC SERPL-MCNC: 87 MG/DL
LDLC SERPL CALC-MCNC: 111 MG/DL (ref 0–100)
POTASSIUM SERPL-SCNC: 4.6 MMOL/L (ref 3.7–5.3)
PROT SERPL-MCNC: 6.2 G/DL (ref 6.4–8.3)
SODIUM SERPL-SCNC: 142 MMOL/L (ref 135–144)
TRIGL SERPL-MCNC: 82 MG/DL
TSH SERPL DL<=0.05 MIU/L-ACNC: 2.1 UIU/ML (ref 0.3–5)
VLDLC SERPL CALC-MCNC: 16 MG/DL

## 2024-09-11 PROCEDURE — 80061 LIPID PANEL: CPT

## 2024-09-11 PROCEDURE — 3075F SYST BP GE 130 - 139MM HG: CPT | Performed by: INTERNAL MEDICINE

## 2024-09-11 PROCEDURE — 99214 OFFICE O/P EST MOD 30 MIN: CPT | Performed by: INTERNAL MEDICINE

## 2024-09-11 PROCEDURE — 84443 ASSAY THYROID STIM HORMONE: CPT

## 2024-09-11 PROCEDURE — 93000 ELECTROCARDIOGRAM COMPLETE: CPT | Performed by: INTERNAL MEDICINE

## 2024-09-11 PROCEDURE — 36415 COLL VENOUS BLD VENIPUNCTURE: CPT

## 2024-09-11 PROCEDURE — 3078F DIAST BP <80 MM HG: CPT | Performed by: INTERNAL MEDICINE

## 2024-09-11 PROCEDURE — 80053 COMPREHEN METABOLIC PANEL: CPT

## 2024-12-30 DIAGNOSIS — F41.9 ANXIETY: ICD-10-CM

## 2024-12-30 NOTE — TELEPHONE ENCOUNTER
Sarah called requesting a refill of the below medication which has been pended for you:     Requested Prescriptions     Pending Prescriptions Disp Refills    ALPRAZolam (XANAX) 0.5 MG tablet 30 tablet 0     Sig: Take 1 tablet by mouth nightly as needed for Sleep or Anxiety for up to 30 days. Max Daily Amount: 0.5 mg       Last Appointment Date: 7/1/2024  Next Appointment Date: 7/1/2025    Allergies   Allergen Reactions    Latex Rash    Codeine Nausea Only and Nausea And Vomiting

## 2025-01-05 RX ORDER — ALPRAZOLAM 0.5 MG
0.5 TABLET ORAL NIGHTLY PRN
Qty: 30 TABLET | Refills: 0 | Status: SHIPPED | OUTPATIENT
Start: 2025-01-05 | End: 2025-02-04

## 2025-03-07 DIAGNOSIS — F41.9 ANXIETY: ICD-10-CM

## 2025-03-07 RX ORDER — ALPRAZOLAM 0.5 MG
0.5 TABLET ORAL NIGHTLY PRN
Qty: 30 TABLET | Refills: 0 | Status: CANCELLED | OUTPATIENT
Start: 2025-03-07 | End: 2025-04-06

## 2025-03-07 NOTE — TELEPHONE ENCOUNTER
Unable to [facility] - showing last fill from 2023. Writer has not received anything from pharmacy.    Sarah called requesting a refill of the below medication which has been pended for you:     Requested Prescriptions     Pending Prescriptions Disp Refills    ALPRAZolam (XANAX) 0.5 MG tablet 30 tablet 0     Sig: Take 1 tablet by mouth nightly as needed for Sleep or Anxiety for up to 30 days. Max Daily Amount: 0.5 mg       Last Appointment Date: 7/1/2024  Next Appointment Date: 7/1/2025    Allergies   Allergen Reactions    Latex Rash    Codeine Nausea Only and Nausea And Vomiting

## 2025-03-07 NOTE — TELEPHONE ENCOUNTER
Pt calling stating she has been [facility] to  [facility] but they keep telling her they can not fill it. She has been out of this for a few months now. Has he pharmacy sent anything about this? Please advise.

## 2025-04-21 DIAGNOSIS — M81.0 AGE-RELATED OSTEOPOROSIS WITHOUT CURRENT PATHOLOGICAL FRACTURE: ICD-10-CM

## 2025-04-21 DIAGNOSIS — I10 ESSENTIAL HYPERTENSION: ICD-10-CM

## 2025-04-21 DIAGNOSIS — E78.00 PURE HYPERCHOLESTEROLEMIA: ICD-10-CM

## 2025-04-21 DIAGNOSIS — Z13.1 SCREENING FOR DIABETES MELLITUS: Primary | ICD-10-CM

## 2025-04-22 RX ORDER — LOSARTAN POTASSIUM 25 MG/1
12.5 TABLET ORAL 2 TIMES DAILY
Qty: 90 TABLET | Refills: 3 | Status: SHIPPED | OUTPATIENT
Start: 2025-04-22

## 2025-04-22 NOTE — TELEPHONE ENCOUNTER
Sarah called requesting a refill of the below medication which has been pended for you:     Requested Prescriptions     Pending Prescriptions Disp Refills    ezetimibe (ZETIA) 10 MG tablet 90 tablet 3     Sig: Take 1 tablet by mouth daily       Last Appointment Date: 7/1/2024  Next Appointment Date: 7/1/2025    Allergies   Allergen Reactions    Latex Rash    Codeine Nausea Only and Nausea And Vomiting

## 2025-04-26 RX ORDER — EZETIMIBE 10 MG/1
10 TABLET ORAL DAILY
Qty: 90 TABLET | Refills: 3 | Status: SHIPPED | OUTPATIENT
Start: 2025-04-26

## 2025-05-31 DIAGNOSIS — F41.9 ANXIETY: ICD-10-CM

## 2025-06-02 RX ORDER — ALPRAZOLAM 0.5 MG
0.5 TABLET ORAL NIGHTLY PRN
Qty: 30 TABLET | Refills: 0 | Status: SHIPPED | OUTPATIENT
Start: 2025-06-05 | End: 2025-07-05

## 2025-06-02 NOTE — TELEPHONE ENCOUNTER
Controlled Substance Monitoring:    Acute and Chronic Pain Monitoring:   RX Monitoring Periodic Controlled Substance Monitoring Chronic Pain > 80 MEDD   6/2/2025   5:38 PM No signs of potential drug abuse or diversion identified. Obtained or confirmed \"Medication Contract\" on file.

## 2025-06-29 ASSESSMENT — PATIENT HEALTH QUESTIONNAIRE - PHQ9
1. LITTLE INTEREST OR PLEASURE IN DOING THINGS: NOT AT ALL
SUM OF ALL RESPONSES TO PHQ QUESTIONS 1-9: 0
SUM OF ALL RESPONSES TO PHQ QUESTIONS 1-9: 0
SUM OF ALL RESPONSES TO PHQ9 QUESTIONS 1 & 2: 0
SUM OF ALL RESPONSES TO PHQ QUESTIONS 1-9: 0
2. FEELING DOWN, DEPRESSED OR HOPELESS: NOT AT ALL
2. FEELING DOWN, DEPRESSED OR HOPELESS: NOT AT ALL
1. LITTLE INTEREST OR PLEASURE IN DOING THINGS: NOT AT ALL
SUM OF ALL RESPONSES TO PHQ QUESTIONS 1-9: 0

## 2025-07-01 ENCOUNTER — OFFICE VISIT (OUTPATIENT)
Dept: FAMILY MEDICINE CLINIC | Age: 65
End: 2025-07-01
Payer: MEDICARE

## 2025-07-01 ENCOUNTER — TELEPHONE (OUTPATIENT)
Dept: SURGERY | Age: 65
End: 2025-07-01

## 2025-07-01 ENCOUNTER — HOSPITAL ENCOUNTER (OUTPATIENT)
Age: 65
Discharge: HOME | End: 2025-07-01
Payer: MEDICARE

## 2025-07-01 VITALS
TEMPERATURE: 97.9 F | OXYGEN SATURATION: 99 % | WEIGHT: 135.8 LBS | HEART RATE: 78 BPM | HEIGHT: 64 IN | BODY MASS INDEX: 23.18 KG/M2 | RESPIRATION RATE: 16 BRPM | SYSTOLIC BLOOD PRESSURE: 120 MMHG | DIASTOLIC BLOOD PRESSURE: 64 MMHG

## 2025-07-01 DIAGNOSIS — R53.83 FATIGUE, UNSPECIFIED TYPE: ICD-10-CM

## 2025-07-01 DIAGNOSIS — Z12.11 SCREENING FOR COLON CANCER: ICD-10-CM

## 2025-07-01 DIAGNOSIS — F41.9 ANXIETY: ICD-10-CM

## 2025-07-01 DIAGNOSIS — I10 ESSENTIAL HYPERTENSION: Primary | ICD-10-CM

## 2025-07-01 DIAGNOSIS — I10 ESSENTIAL HYPERTENSION: ICD-10-CM

## 2025-07-01 DIAGNOSIS — E78.00 PURE HYPERCHOLESTEROLEMIA: ICD-10-CM

## 2025-07-01 DIAGNOSIS — Z13.1 SCREENING FOR DIABETES MELLITUS: ICD-10-CM

## 2025-07-01 DIAGNOSIS — M71.22 BAKER'S CYST OF KNEE, LEFT: ICD-10-CM

## 2025-07-01 DIAGNOSIS — M81.0 AGE-RELATED OSTEOPOROSIS WITHOUT CURRENT PATHOLOGICAL FRACTURE: ICD-10-CM

## 2025-07-01 LAB
25(OH)D3 SERPL-MCNC: 85.7 NG/ML (ref 30–100)
ALBUMIN SERPL-MCNC: 4.2 G/DL (ref 3.5–5.2)
ALBUMIN/GLOB SERPL: 2 {RATIO} (ref 1–2.5)
ALP SERPL-CCNC: 78 U/L (ref 35–104)
ALT SERPL-CCNC: 20 U/L (ref 10–35)
ANION GAP SERPL CALCULATED.3IONS-SCNC: 10 MMOL/L (ref 9–16)
AST SERPL-CCNC: 20 U/L (ref 10–35)
BILIRUB SERPL-MCNC: <0.2 MG/DL (ref 0–1.2)
BUN SERPL-MCNC: 18 MG/DL (ref 8–23)
BUN/CREAT SERPL: 26 (ref 9–20)
CALCIUM SERPL-MCNC: 9.4 MG/DL (ref 8.6–10.4)
CHLORIDE SERPL-SCNC: 105 MMOL/L (ref 98–107)
CHOLEST SERPL-MCNC: 197 MG/DL (ref 0–199)
CHOLESTEROL/HDL RATIO: 2.7
CO2 SERPL-SCNC: 26 MMOL/L (ref 20–31)
CREAT SERPL-MCNC: 0.7 MG/DL (ref 0.6–0.9)
ERYTHROCYTE [DISTWIDTH] IN BLOOD BY AUTOMATED COUNT: 13.6 % (ref 11.8–14.4)
EST. AVERAGE GLUCOSE BLD GHB EST-MCNC: 105 MG/DL
FOLATE SERPL-MCNC: 21.4 NG/ML (ref 4.8–24.2)
GFR, ESTIMATED: >90 ML/MIN/1.73M2
GLUCOSE SERPL-MCNC: 90 MG/DL (ref 74–99)
HBA1C MFR BLD: 5.3 % (ref 4–6)
HCT VFR BLD AUTO: 39.3 % (ref 36.3–47.1)
HDLC SERPL-MCNC: 72 MG/DL
HGB BLD-MCNC: 12.8 G/DL (ref 11.9–15.1)
LDLC SERPL CALC-MCNC: 99 MG/DL (ref 0–100)
MCH RBC QN AUTO: 29.9 PG (ref 25.2–33.5)
MCHC RBC AUTO-ENTMCNC: 32.6 G/DL (ref 25.2–33.5)
MCV RBC AUTO: 91.8 FL (ref 82.6–102.9)
NRBC BLD-RTO: 0 PER 100 WBC
PLATELET # BLD AUTO: 241 K/UL (ref 138–453)
PMV BLD AUTO: 11.7 FL (ref 8.1–13.5)
POTASSIUM SERPL-SCNC: 4.2 MMOL/L (ref 3.7–5.3)
PROT SERPL-MCNC: 6.3 G/DL (ref 6.6–8.7)
RBC # BLD AUTO: 4.28 M/UL (ref 3.95–5.11)
SODIUM SERPL-SCNC: 141 MMOL/L (ref 136–145)
TRIGL SERPL-MCNC: 131 MG/DL
TSH SERPL DL<=0.05 MIU/L-ACNC: 2.13 UIU/ML (ref 0.27–4.2)
VIT B12 SERPL-MCNC: 726 PG/ML (ref 232–1245)
VLDLC SERPL CALC-MCNC: 26 MG/DL (ref 1–30)
WBC OTHER # BLD: 3.9 K/UL (ref 3.5–11.3)

## 2025-07-01 PROCEDURE — G8420 CALC BMI NORM PARAMETERS: HCPCS | Performed by: FAMILY MEDICINE

## 2025-07-01 PROCEDURE — 80053 COMPREHEN METABOLIC PANEL: CPT

## 2025-07-01 PROCEDURE — G8427 DOCREV CUR MEDS BY ELIG CLIN: HCPCS | Performed by: FAMILY MEDICINE

## 2025-07-01 PROCEDURE — 99214 OFFICE O/P EST MOD 30 MIN: CPT | Performed by: FAMILY MEDICINE

## 2025-07-01 PROCEDURE — G2211 COMPLEX E/M VISIT ADD ON: HCPCS | Performed by: FAMILY MEDICINE

## 2025-07-01 PROCEDURE — 36415 COLL VENOUS BLD VENIPUNCTURE: CPT

## 2025-07-01 PROCEDURE — 1036F TOBACCO NON-USER: CPT | Performed by: FAMILY MEDICINE

## 2025-07-01 PROCEDURE — 82607 VITAMIN B-12: CPT

## 2025-07-01 PROCEDURE — 1090F PRES/ABSN URINE INCON ASSESS: CPT | Performed by: FAMILY MEDICINE

## 2025-07-01 PROCEDURE — 1123F ACP DISCUSS/DSCN MKR DOCD: CPT | Performed by: FAMILY MEDICINE

## 2025-07-01 PROCEDURE — 84443 ASSAY THYROID STIM HORMONE: CPT

## 2025-07-01 PROCEDURE — 82306 VITAMIN D 25 HYDROXY: CPT

## 2025-07-01 PROCEDURE — 83036 HEMOGLOBIN GLYCOSYLATED A1C: CPT

## 2025-07-01 PROCEDURE — 3078F DIAST BP <80 MM HG: CPT | Performed by: FAMILY MEDICINE

## 2025-07-01 PROCEDURE — 82746 ASSAY OF FOLIC ACID SERUM: CPT

## 2025-07-01 PROCEDURE — 99214 OFFICE O/P EST MOD 30 MIN: CPT

## 2025-07-01 PROCEDURE — G8399 PT W/DXA RESULTS DOCUMENT: HCPCS | Performed by: FAMILY MEDICINE

## 2025-07-01 PROCEDURE — 3017F COLORECTAL CA SCREEN DOC REV: CPT | Performed by: FAMILY MEDICINE

## 2025-07-01 PROCEDURE — 3074F SYST BP LT 130 MM HG: CPT | Performed by: FAMILY MEDICINE

## 2025-07-01 PROCEDURE — 85027 COMPLETE CBC AUTOMATED: CPT

## 2025-07-01 PROCEDURE — 80061 LIPID PANEL: CPT

## 2025-07-01 RX ORDER — ALPRAZOLAM 0.5 MG
0.5 TABLET ORAL NIGHTLY PRN
Qty: 30 TABLET | Refills: 0 | Status: CANCELLED | OUTPATIENT
Start: 2025-07-01 | End: 2025-07-31

## 2025-07-01 SDOH — ECONOMIC STABILITY: FOOD INSECURITY: WITHIN THE PAST 12 MONTHS, THE FOOD YOU BOUGHT JUST DIDN'T LAST AND YOU DIDN'T HAVE MONEY TO GET MORE.: NEVER TRUE

## 2025-07-01 SDOH — ECONOMIC STABILITY: FOOD INSECURITY: WITHIN THE PAST 12 MONTHS, YOU WORRIED THAT YOUR FOOD WOULD RUN OUT BEFORE YOU GOT MONEY TO BUY MORE.: NEVER TRUE

## 2025-07-01 NOTE — PROGRESS NOTES
[facility]  [address] [id number]  [phone]      Sarah Silva is a 65 y.o. female who presents today for her medical conditions/complaints as noted below.  Sarah Silva is c/o of Fatigue and Annual Exam      HPI:     History of Present Illness  The patient is a 65-year-old female who presents for a yearly follow-up on hemorrhoids, fatigue, Baker's cyst, hypertension, and health maintenance.    She has been experiencing fatigue for the past 1.5 to 2 weeks, which she describes as unusual and unexplained. She suspects it may be related to her potassium or iron levels or white blood cell count. She recalls having a cold or sinus infection about 1.5 months ago, which was accompanied by a persistent cough. She did not seek medical attention at that time. Her diet remains unchanged, and she often feels the need to sleep in the afternoon. She occasionally wakes up in the middle of the night but manages to fall back asleep. She reports no changes in her medication or vitamin supplement intake over the past 2 weeks. She uses Xanax once a week, taking half a tablet when stressed. She does not believe her mood is contributing to her fatigue. She monitors her blood pressure and heart rate regularly and reports no significant changes. She has not altered her caffeine intake. She is unsure if her B12 levels have been checked recently. She reports no chest pain, palpitations, pressure, shortness of breath, or dizziness. She also reports no black stools, blood in urine, or burning with urination. She does not feel like she is developing any new symptoms or illnesses.    She has a Baker's cyst behind her left knee, which she first noticed in early May 2025. It was initially painful but has since improved. She believes the cyst developed due to her habit of sitting on the floor and standing up from that position. She experiences occasional popping sounds during yoga and

## 2025-07-14 DIAGNOSIS — I10 ESSENTIAL HYPERTENSION: ICD-10-CM

## 2025-07-14 DIAGNOSIS — F41.9 ANXIETY: ICD-10-CM

## 2025-07-14 RX ORDER — LOSARTAN POTASSIUM 25 MG/1
12.5 TABLET ORAL 2 TIMES DAILY
Qty: 90 TABLET | Refills: 3 | OUTPATIENT
Start: 2025-07-14

## 2025-07-14 NOTE — TELEPHONE ENCOUNTER
Per [facility], last fill 6/17, quantity 30 for 30 days.     Sarah called requesting a refill of the below medication which has been pended for you:     Requested Prescriptions     Pending Prescriptions Disp Refills    ALPRAZolam (XANAX) 0.5 MG tablet [Pharmacy Med Name: ALPRAZOLAM 0.5MG TABLETS] 30 tablet      Sig: TAKE 1 TABLET BY MOUTH EVERY NIGHT AS NEEDED FOR SLEEP OR ANXIETY. MAX DAILY AMOUNT: 0.5 MG       Last Appointment Date: 7/1/2025  Next Appointment Date: 9/10/2025    Allergies   Allergen Reactions    Latex Rash    Codeine Nausea Only and Nausea And Vomiting

## 2025-07-19 RX ORDER — ALPRAZOLAM 0.5 MG
0.5 TABLET ORAL NIGHTLY PRN
Qty: 30 TABLET | Refills: 0 | Status: SHIPPED | OUTPATIENT
Start: 2025-07-19 | End: 2025-08-18

## 2025-07-19 NOTE — TELEPHONE ENCOUNTER
Controlled Substance Monitoring:    Acute and Chronic Pain Monitoring:   RX Monitoring Periodic Controlled Substance Monitoring   7/19/2025  12:41 PM No signs of potential drug abuse or diversion identified.

## 2025-08-09 DIAGNOSIS — I10 ESSENTIAL HYPERTENSION: ICD-10-CM

## 2025-08-11 RX ORDER — LOSARTAN POTASSIUM 25 MG/1
12.5 TABLET ORAL 2 TIMES DAILY
Qty: 90 TABLET | Refills: 3 | Status: SHIPPED | OUTPATIENT
Start: 2025-08-11

## (undated) DEVICE — BONE BIOPSY DEVICE F07A TAPERED SIZE 2: Brand: MEDTRONIC REUSABLE INSTRUMENTS

## (undated) DEVICE — NEEDLE SPNL 22GA L3.5IN BLK HUB S STL REG WALL FIT STYL W/

## (undated) DEVICE — TOWEL,OR,DSP,ST,BLUE,STD,4/PK,20PK/CS: Brand: MEDLINE

## (undated) DEVICE — IBT KIT KEX152EB-A FF E2 15/2 OI: Brand: KYPHON® EXPRESS™ OSTEO INTRODUCER® SYSTEM AND BFD

## (undated) DEVICE — STRIP,CLOSURE,WOUND,MEDI-STRIP,1/2X4: Brand: MEDLINE

## (undated) DEVICE — 3M™ TEGADERM™ TRANSPARENT FILM DRESSING FRAME STYLE, 1626W, 4 IN X 4-3/4 IN (10 CM X 12 CM), 50/CT 4CT/CASE: Brand: 3M™ TEGADERM™

## (undated) DEVICE — CURETTE A13A SIZE 2 T-TIP: Brand: KYPHON® EXPRESS ™ CURETTE

## (undated) DEVICE — COVER LT HNDL BLU PLAS

## (undated) DEVICE — BONE CEMENT CX01B KYPHON XPEDE W MXR US: Brand: KYPHON® XPEDE™ BONE CEMENT AND KYPHON® MIXER PACK

## (undated) DEVICE — GAUZE,SPONGE,4"X4",16PLY,XRAY,STRL,LF: Brand: MEDLINE

## (undated) DEVICE — GAUZE,SPONGE,4"X4",12PLY,STERILE,LF,2'S: Brand: MEDLINE

## (undated) DEVICE — INTENDED FOR TISSUE SEPARATION, AND OTHER PROCEDURES THAT REQUIRE A SHARP SURGICAL BLADE TO PUNCTURE OR CUT.: Brand: BARD-PARKER ® CARBON RIB-BACK BLADES

## (undated) DEVICE — MARKER W/PRE PRINTED CUSTOM LABEL

## (undated) DEVICE — COUNTER NEEDLE 10/20 COUNT DEVON   96/CS

## (undated) DEVICE — DRESSING TRNSPAR W5XL4.5IN FLM SHT SEMIPERMEABLE WIND

## (undated) DEVICE — PACK SURG PROC REMINDER N WVN DISPOSABLE BEAC TIME OUT

## (undated) DEVICE — COVER,TABLE,HEAVY DUTY,77"X90",STRL: Brand: MEDLINE

## (undated) DEVICE — GLOVE ORANGE PI 8   MSG9080

## (undated) DEVICE — SHEET, T, LAPAROTOMY, STERILE: Brand: MEDLINE

## (undated) DEVICE — TIDISHIELD BAND BAGS CLEAR POLYETHYLENE STERILE 20IN X 20IN 100 PER CASE: Brand: TIDISHIELD